# Patient Record
Sex: MALE | Race: WHITE | Employment: STUDENT | URBAN - METROPOLITAN AREA
[De-identification: names, ages, dates, MRNs, and addresses within clinical notes are randomized per-mention and may not be internally consistent; named-entity substitution may affect disease eponyms.]

---

## 2017-03-15 ENCOUNTER — OFFICE VISIT (OUTPATIENT)
Dept: FAMILY MEDICINE CLINIC | Facility: CLINIC | Age: 22
End: 2017-03-15

## 2017-03-15 VITALS
WEIGHT: 109.19 LBS | OXYGEN SATURATION: 98 % | HEIGHT: 71.5 IN | HEART RATE: 89 BPM | TEMPERATURE: 99 F | BODY MASS INDEX: 14.95 KG/M2 | SYSTOLIC BLOOD PRESSURE: 90 MMHG | DIASTOLIC BLOOD PRESSURE: 66 MMHG | RESPIRATION RATE: 14 BRPM

## 2017-03-15 DIAGNOSIS — K59.00 CONSTIPATION, UNSPECIFIED CONSTIPATION TYPE: Primary | ICD-10-CM

## 2017-03-15 PROCEDURE — 99213 OFFICE O/P EST LOW 20 MIN: CPT | Performed by: PHYSICIAN ASSISTANT

## 2017-03-15 NOTE — PROGRESS NOTES
CHIEF COMPLAINT:   Patient presents with:  Constipation        HPI:   Kosta Blue is a 24year old male who presents for complaints of possible constipation for 3 weeks.   Pt explains he does not if he truly has constipation, but he feels he does Meclizine HCl (ANTIVERT) 12.5 MG Oral Tab Take 1 tablet (12.5 mg total) by mouth 3 (three) times daily as needed for Dizziness. Disp: 45 tablet Rfl: 0   loratadine (CLARITIN) 10 MG Oral Tab Take 1 tablet by mouth daily.  Disp: 30 tablet Rfl: 11      Past Me Diagnoses and all orders for this visit:    Constipation, unspecified constipation type        PLAN:    -Discussed taking miralax more frequently.    -Pt advised [facility] cannot work up [employer] and he needs to see his PCP and go to ED immediately with any · Rectal bleeding from hemorrhoids or anal fissures (skin tears)  · Hernias  · Dependency on laxatives  · Chronic constipation  · Fecal impaction  · Bowel obstruction or perforation  Home care  All treatment should be done after talking with your healthcar When to seek medical advice  Call your healthcare provider right away if any of these occur:  · Fever over 100.4°F (38°C)  · Failure to resume normal bowel movements  · Pain in your abdomen or back gets worse  · Nausea or vomiting  · Swelling in your abdom Exercise helps improve the working of your colon which helps ease constipation. Try to get some physical activity every day. If you haven’t been active for a while, talk to your healthcare provider before starting again.   Laxatives  Your healthcare provide · Soluble fiber. This type of fiber is in oats, beans, and certain fruits and vegetables such as strawberries and peas. Soluble fiber can reduce cholesterol, which may help lower the risk for heart disease. It also helps control blood sugar levels.   Look f Date Last Reviewed: 5/11/2015  © 8401-6802 The [address] [place] [address], [address] [place], [place] [address]. All rights reserved. This information is not intended as a substitute for professional medical care.  Always follow your healthcare professional

## 2017-03-16 NOTE — PATIENT INSTRUCTIONS
-Start using miralax more frequently- try once a day or every other day for 1 week. -Push fruits and veggies.   -Drink plenty of water  -Follow up with PCP with \"brain fog\" and any worsening symptoms      Constipation (Adult)  Constipation means that yo All treatment should be done after talking with your healthcare provider. This is especially true if you have another medical problems, are taking prescription medicines, or are an older adult. Treatment most often involves lifestyle changes.  You may also · Pain in your abdomen or back gets worse  · Nausea or vomiting  · Swelling in your abdomen  · Blood in the stool  · Black, tarry stool  · Involuntary weight loss  · Weakness  Date Last Reviewed: 12/30/2015  © 4337-7069 The [employer] Your healthcare provider may suggest an over-the-counter product to help ease your constipation. He or she may suggest the use of bulk-forming agents or laxatives. The use of laxatives, if used as directed, is common and safe.  Follow directions carefully w · Whole-grain breads and cereals. Try to eat 6 to 8 ounces a day. Include wheat and oat bran cereals, whole-wheat muffins or toast, and corn tortillas in your meals. · Fruits. Try to eat 2 cups a day.  Apples, oranges, strawberries, pears, and bananas are

## 2017-03-23 RX ORDER — MONTELUKAST SODIUM 10 MG/1
TABLET ORAL
Qty: 90 TABLET | Refills: 1 | Status: SHIPPED | OUTPATIENT
Start: 2017-03-23 | End: 2017-09-28

## 2017-03-23 NOTE — TELEPHONE ENCOUNTER
Requesting Montelukast  LOV: 5/13/16  RTC: not noted   Last Labs: used for allergic rhinitis  Filled: 1/29/16 #90 with 4 refills    Future Appointments  Date Time Provider Aman Foote   4/8/2017 12:30 PM Julie Fothergill, MD [facility] [address] [facility] [address]       [place]

## 2017-04-08 ENCOUNTER — OFFICE VISIT (OUTPATIENT)
Dept: FAMILY MEDICINE CLINIC | Facility: CLINIC | Age: 22
End: 2017-04-08

## 2017-04-08 VITALS
TEMPERATURE: 98 F | SYSTOLIC BLOOD PRESSURE: 110 MMHG | BODY MASS INDEX: 14.79 KG/M2 | HEIGHT: 71.5 IN | HEART RATE: 80 BPM | DIASTOLIC BLOOD PRESSURE: 60 MMHG | WEIGHT: 108 LBS | RESPIRATION RATE: 16 BRPM

## 2017-04-08 DIAGNOSIS — K59.03 DRUG-INDUCED CONSTIPATION: Primary | ICD-10-CM

## 2017-04-08 DIAGNOSIS — R41.3 MEMORY DEFICIT: ICD-10-CM

## 2017-04-08 PROBLEM — K59.00 CONSTIPATION: Status: ACTIVE | Noted: 2017-04-08

## 2017-04-08 PROCEDURE — 99213 OFFICE O/P EST LOW 20 MIN: CPT | Performed by: INTERNAL MEDICINE

## 2017-04-08 RX ORDER — AMOXICILLIN 250 MG
1 CAPSULE ORAL 2 TIMES DAILY
Qty: 60 TABLET | Refills: 3 | Status: SHIPPED | OUTPATIENT
Start: 2017-04-08 | End: 2018-09-05 | Stop reason: ALTCHOICE

## 2017-04-08 RX ORDER — LACTULOSE 20 G/30ML
20 SOLUTION ORAL 3 TIMES DAILY PRN
Qty: 473 ML | Refills: 1 | Status: SHIPPED | OUTPATIENT
Start: 2017-04-08 | End: 2018-09-05 | Stop reason: ALTCHOICE

## 2017-04-08 NOTE — PROGRESS NOTES
[facility] Internal Medicine Office Note  Chief Complaint:   Patient presents with:  Constipation: went to [facility] on 3/15/16   Other: [facility]      HPI:   This is a 24year old male coming in for  HPI  Confused  +constipation  3 weeks  No f/c/s 71. 5\". Weight as of this encounter: 108 lb. Vital signs reviewed. Appears stated age, well groomed. With brown hair  Physical Exam    Constitutional: He appears well-developed. Cardiovascular: Normal rate. No murmur heard.   Pulmonary/Chest: Eff symptoms. Patient is to call with any side effects or complications from the treatments as a result of today.    Patient Active Problem List:     Allergic rhinitis     ADHD (attention deficit hyperactivity disorder)     Anxiety     Asperger syndrome     Ins

## 2017-04-08 NOTE — PATIENT INSTRUCTIONS
Thank you for choosing Juan Jose Gilmore MD at [place] [address]  To Do: Esthela Shepard  1. Stage 1- Continue miralax or metamucil every other day  2.  Stage 2- if constipation no bowel movement in a day- Start senokot pill twice a day if still constipated therapies have potential risk of harm or side effects or medication interactions.  It is your duty and for your safety to discuss with the pharmacist and our office with questions, and to notify us and stop treatment if problems arise, but know that our in

## 2017-07-25 ENCOUNTER — OFFICE VISIT (OUTPATIENT)
Dept: FAMILY MEDICINE CLINIC | Facility: CLINIC | Age: 22
End: 2017-07-25

## 2017-07-25 VITALS
HEART RATE: 82 BPM | HEIGHT: 71.5 IN | WEIGHT: 102 LBS | RESPIRATION RATE: 16 BRPM | DIASTOLIC BLOOD PRESSURE: 64 MMHG | SYSTOLIC BLOOD PRESSURE: 108 MMHG | TEMPERATURE: 100 F | BODY MASS INDEX: 13.97 KG/M2

## 2017-07-25 DIAGNOSIS — F90.2 ATTENTION DEFICIT HYPERACTIVITY DISORDER (ADHD), COMBINED TYPE: ICD-10-CM

## 2017-07-25 DIAGNOSIS — R41.0 CONFUSION: ICD-10-CM

## 2017-07-25 DIAGNOSIS — Z51.81 THERAPEUTIC DRUG MONITORING: Primary | ICD-10-CM

## 2017-07-25 DIAGNOSIS — F41.9 ANXIETY: ICD-10-CM

## 2017-07-25 DIAGNOSIS — H61.21 CERUMEN DEBRIS ON TYMPANIC MEMBRANE OF RIGHT EAR: ICD-10-CM

## 2017-07-25 PROCEDURE — 99215 OFFICE O/P EST HI 40 MIN: CPT | Performed by: INTERNAL MEDICINE

## 2017-07-25 RX ORDER — LORATADINE 10 MG/1
10 TABLET ORAL DAILY PRN
Qty: 30 TABLET | Refills: 11 | COMMUNITY
Start: 2017-07-25

## 2017-07-25 NOTE — PROGRESS NOTES
[facility] Internal Medicine Office Note  Chief Complaint:   Patient presents with:  Medication Follow-Up  med discussion  Anxiety  r ear pain    HPI:   This is a 24year old male coming in for  HPI  Pt with numerous complaints  Pt has asperger s Disp: 473 mL Rfl: 1   MONTELUKAST SODIUM 10 MG Oral Tab TAKE ONE TABLET BY MOUTH EVERY EVENING FOR ALLERGIES Disp: 90 tablet Rfl: 1   Methylphenidate HCl ER (CONCERTA) 54 MG Oral Tab CR Take 54 mg by mouth every morning.  Disp:  Rfl:    risperiDONE (RISPERD Weight as of this encounter: 102 lb. Vital signs reviewed. Appears stated age, well groomed. With brown hair  Physical Exam   Vitals reviewed. Constitutional: He is oriented to person, place, and time and thin. He appears well-developed.    HAYLEYT:   Omega Rosenthal proceeds to pull out his ipad  With a list of questions  Start Time: 4  End Time: 445  Therefore more than 40 minutes were spent face to face with the patient in which over half of that time was spent coordinating and counseling on  Numerous medical compla to call with any questions, complications, allergies, or worsening or changing symptoms. Patient is to call with any side effects or complications from the treatments as a result of today.    Patient Active Problem List:     Allergic rhinitis     ADHD (atte

## 2017-07-25 NOTE — PATIENT INSTRUCTIONS
Thank you for choosing Bryson Winters MD at [place] [address]  To Do: Merrick Lea  1. risperdal might cause brain fog and tiredness- an alternative could be abilify or trintellix  2. concerta is one of the least for constipation  3.  Trazodone helps w 2D Echocardiograms)  •[facility] call [phone] usually in [facility] in Clinic in [place] at [facility].  [address] Mon-Fri at 8am-7:30pm, and Sat/Sun 9am-430pm  Also at [address]  Call [phone] for info    • Please c twice a year.

## 2017-08-07 ENCOUNTER — TELEPHONE (OUTPATIENT)
Dept: FAMILY MEDICINE CLINIC | Facility: CLINIC | Age: 22
End: 2017-08-07

## 2017-08-07 NOTE — TELEPHONE ENCOUNTER
Requesting referral for ear pain  LOV: 7/25/17  Earwax and cerumen he declined ear cleaning but gladly accepted referral to ent. Henrietta Ochoa MD  RTC: none specified    Morris Troy M.D.   Otolaryngology (ENT)  [address] Consultants

## 2017-09-09 ENCOUNTER — LAB ENCOUNTER (OUTPATIENT)
Dept: LAB | Age: 22
End: 2017-09-09
Attending: INTERNAL MEDICINE
Payer: COMMERCIAL

## 2017-09-09 DIAGNOSIS — F41.9 ANXIETY: ICD-10-CM

## 2017-09-09 DIAGNOSIS — R41.0 CONFUSION: ICD-10-CM

## 2017-09-09 LAB
ALBUMIN SERPL-MCNC: 4.3 G/DL (ref 3.5–4.8)
ALP LIVER SERPL-CCNC: 47 U/L (ref 45–117)
ALT SERPL-CCNC: 32 U/L (ref 17–63)
AST SERPL-CCNC: 24 U/L (ref 15–41)
BASOPHILS # BLD AUTO: 0.03 X10(3) UL (ref 0–0.1)
BASOPHILS NFR BLD AUTO: 0.5 %
BILIRUB SERPL-MCNC: 0.6 MG/DL (ref 0.1–2)
BUN BLD-MCNC: 13 MG/DL (ref 8–20)
CALCIUM BLD-MCNC: 9.2 MG/DL (ref 8.3–10.3)
CHLORIDE: 103 MMOL/L (ref 101–111)
CO2: 30 MMOL/L (ref 22–32)
CREAT BLD-MCNC: 0.89 MG/DL (ref 0.7–1.3)
EOSINOPHIL # BLD AUTO: 0.06 X10(3) UL (ref 0–0.3)
EOSINOPHIL NFR BLD AUTO: 1.1 %
ERYTHROCYTE [DISTWIDTH] IN BLOOD BY AUTOMATED COUNT: 12.6 % (ref 11.5–16)
FOLATE (FOLIC ACID), SERUM: 14.7 NG/ML (ref 8.7–24)
FREE T4: 1 NG/DL (ref 0.9–1.8)
GLUCOSE BLD-MCNC: 79 MG/DL (ref 70–99)
HAV AB SERPL IA-ACNC: 683 PG/ML (ref 193–986)
HCT VFR BLD AUTO: 42.2 % (ref 37–53)
HGB BLD-MCNC: 14.2 G/DL (ref 13–17)
IMMATURE GRANULOCYTE COUNT: 0.01 X10(3) UL (ref 0–1)
IMMATURE GRANULOCYTE RATIO %: 0.2 %
IRON SATURATION: 12 % (ref 13–45)
IRON: 38 UG/DL (ref 45–182)
LYMPHOCYTES # BLD AUTO: 0.87 X10(3) UL (ref 0.9–4)
LYMPHOCYTES NFR BLD AUTO: 15.4 %
M PROTEIN MFR SERPL ELPH: 7.1 G/DL (ref 6.1–8.3)
MCH RBC QN AUTO: 33.2 PG (ref 27–33.2)
MCHC RBC AUTO-ENTMCNC: 33.6 G/DL (ref 31–37)
MCV RBC AUTO: 98.6 FL (ref 80–99)
MONOCYTES # BLD AUTO: 0.46 X10(3) UL (ref 0.1–0.6)
MONOCYTES NFR BLD AUTO: 8.1 %
NEUTROPHIL ABS PRELIM: 4.22 X10 (3) UL (ref 1.3–6.7)
NEUTROPHILS # BLD AUTO: 4.22 X10(3) UL (ref 1.3–6.7)
NEUTROPHILS NFR BLD AUTO: 74.7 %
PLATELET # BLD AUTO: 188 10(3)UL (ref 150–450)
POTASSIUM SERPL-SCNC: 4.3 MMOL/L (ref 3.6–5.1)
RBC # BLD AUTO: 4.28 X10(6)UL (ref 4.3–5.7)
RED CELL DISTRIBUTION WIDTH-SD: 45.5 FL (ref 35.1–46.3)
SODIUM SERPL-SCNC: 140 MMOL/L (ref 136–144)
TOTAL IRON BINDING CAPACITY: 328 UG/DL (ref 298–536)
TRANSFERRIN: 220 MG/DL (ref 200–360)
TSI SER-ACNC: 0.41 MIU/ML (ref 0.35–5.5)
WBC # BLD AUTO: 5.7 X10(3) UL (ref 4–13)

## 2017-09-09 PROCEDURE — 80050 GENERAL HEALTH PANEL: CPT | Performed by: INTERNAL MEDICINE

## 2017-09-09 PROCEDURE — 82607 VITAMIN B-12: CPT | Performed by: INTERNAL MEDICINE

## 2017-09-09 PROCEDURE — 84439 ASSAY OF FREE THYROXINE: CPT | Performed by: INTERNAL MEDICINE

## 2017-09-09 PROCEDURE — 82746 ASSAY OF FOLIC ACID SERUM: CPT | Performed by: INTERNAL MEDICINE

## 2017-09-09 PROCEDURE — 83540 ASSAY OF IRON: CPT | Performed by: INTERNAL MEDICINE

## 2017-09-09 PROCEDURE — 36415 COLL VENOUS BLD VENIPUNCTURE: CPT | Performed by: INTERNAL MEDICINE

## 2017-09-09 PROCEDURE — 83550 IRON BINDING TEST: CPT | Performed by: INTERNAL MEDICINE

## 2017-09-10 NOTE — PROGRESS NOTES
Labs here are normal,  all are fine. Would not change patient's current regimen. Please notify the patient.

## 2017-09-29 RX ORDER — MONTELUKAST SODIUM 10 MG/1
TABLET ORAL
Qty: 90 TABLET | Refills: 1 | Status: SHIPPED | OUTPATIENT
Start: 2017-09-29 | End: 2018-09-05

## 2017-09-29 NOTE — TELEPHONE ENCOUNTER
Requesting Montelukast  LOV: 7/25/17  RTC: not noted  Last Labs: used for allergic rhinitis  Filled: 3/23/17 #90 with 1 refills    Fails protocol - pended  Time started: 679    Time ended: 638    Total time spent on chart: 2 min      Future Appointments  D

## 2018-09-05 ENCOUNTER — OFFICE VISIT (OUTPATIENT)
Dept: FAMILY MEDICINE CLINIC | Facility: CLINIC | Age: 23
End: 2018-09-05
Payer: COMMERCIAL

## 2018-09-05 VITALS
DIASTOLIC BLOOD PRESSURE: 60 MMHG | HEART RATE: 80 BPM | SYSTOLIC BLOOD PRESSURE: 120 MMHG | RESPIRATION RATE: 16 BRPM | BODY MASS INDEX: 15.34 KG/M2 | HEIGHT: 71.5 IN | TEMPERATURE: 99 F | WEIGHT: 112 LBS

## 2018-09-05 DIAGNOSIS — J30.9 ALLERGIC RHINITIS, UNSPECIFIED SEASONALITY, UNSPECIFIED TRIGGER: Primary | ICD-10-CM

## 2018-09-05 PROCEDURE — 99203 OFFICE O/P NEW LOW 30 MIN: CPT | Performed by: FAMILY MEDICINE

## 2018-09-05 RX ORDER — MONTELUKAST SODIUM 10 MG/1
TABLET ORAL
Qty: 90 TABLET | Refills: 1 | Status: SHIPPED | OUTPATIENT
Start: 2018-09-05 | End: 2019-03-11

## 2018-09-05 RX ORDER — ARIPIPRAZOLE 5 MG/1
1 TABLET ORAL DAILY
COMMUNITY
Start: 2018-07-20 | End: 2018-12-12

## 2018-09-05 NOTE — PROGRESS NOTES
HPI:    Patient ID: Mary Renee is a 20year old male. HPI  Mr. Kimberley Schmitt is a pleasant 21 y/o M with history of autism spectrum, ADHD, depression, chronic allergies here today to establish care with me.   He goes and sees psychiatry for his psychiatric rhinorrhea. Right sinus exhibits no maxillary sinus tenderness and no frontal sinus tenderness. Left sinus exhibits no maxillary sinus tenderness and no frontal sinus tenderness.    Mouth/Throat: Uvula is midline and oropharynx is clear and moist. No oropha

## 2018-09-05 NOTE — PATIENT INSTRUCTIONS
Thank you for choosing Avery Lowe MD at [employer] [employer] [facility]  To Do: Sobia Akbar  1. Please take meds as directed. [facility] is located in [address]. Monday, Tuesday & Friday – 8 a.m. to 4 p.m. Wednesday, Thursday – 7 a.m. to 3 p. outweigh those potential risks and we strive to make you healthier and to improve your quality of life.     Referrals, and Radiology Information:    If your insurance requires a referral to a specialist, please allow 5 business days to process your referral

## 2018-11-10 ENCOUNTER — LAB ENCOUNTER (OUTPATIENT)
Dept: LAB | Age: 23
End: 2018-11-10
Attending: NURSE PRACTITIONER
Payer: COMMERCIAL

## 2018-11-10 ENCOUNTER — IMMUNIZATION (OUTPATIENT)
Dept: FAMILY MEDICINE CLINIC | Facility: CLINIC | Age: 23
End: 2018-11-10
Payer: COMMERCIAL

## 2018-11-10 DIAGNOSIS — R53.83 OTHER FATIGUE: ICD-10-CM

## 2018-11-10 DIAGNOSIS — Z23 NEED FOR VACCINATION: ICD-10-CM

## 2018-11-10 PROCEDURE — 90686 IIV4 VACC NO PRSV 0.5 ML IM: CPT | Performed by: NURSE PRACTITIONER

## 2018-11-10 PROCEDURE — 90471 IMMUNIZATION ADMIN: CPT | Performed by: NURSE PRACTITIONER

## 2018-11-10 PROCEDURE — 84443 ASSAY THYROID STIM HORMONE: CPT

## 2018-11-10 PROCEDURE — 82306 VITAMIN D 25 HYDROXY: CPT

## 2018-11-10 PROCEDURE — 80053 COMPREHEN METABOLIC PANEL: CPT

## 2018-11-10 PROCEDURE — 85025 COMPLETE CBC W/AUTO DIFF WBC: CPT

## 2018-11-10 PROCEDURE — 80307 DRUG TEST PRSMV CHEM ANLYZR: CPT

## 2018-11-10 PROCEDURE — 82746 ASSAY OF FOLIC ACID SERUM: CPT

## 2018-11-10 PROCEDURE — 36415 COLL VENOUS BLD VENIPUNCTURE: CPT

## 2018-11-10 PROCEDURE — 84439 ASSAY OF FREE THYROXINE: CPT

## 2018-11-10 PROCEDURE — 82607 VITAMIN B-12: CPT

## 2018-11-12 NOTE — PROGRESS NOTES
Largely within normal limits; vitamin d somewhat low, can take over the counter supplementation 1000 iu's. Globulin also very slightly low, but not likely meaningful.

## 2018-11-14 ENCOUNTER — MED REC SCAN ONLY (OUTPATIENT)
Dept: FAMILY MEDICINE CLINIC | Facility: CLINIC | Age: 23
End: 2018-11-14

## 2019-02-09 ENCOUNTER — APPOINTMENT (OUTPATIENT)
Dept: LAB | Age: 24
End: 2019-02-09
Attending: NURSE PRACTITIONER
Payer: COMMERCIAL

## 2019-02-09 DIAGNOSIS — Z79.899 MEDICATION MANAGEMENT: ICD-10-CM

## 2019-02-09 PROCEDURE — 93010 ELECTROCARDIOGRAM REPORT: CPT | Performed by: INTERNAL MEDICINE

## 2019-02-09 PROCEDURE — 93005 ELECTROCARDIOGRAM TRACING: CPT

## 2019-02-11 LAB
ATRIAL RATE: 85 BPM
P AXIS: 88 DEGREES
P-R INTERVAL: 118 MS
Q-T INTERVAL: 366 MS
QRS DURATION: 100 MS
QTC CALCULATION (BEZET): 435 MS
R AXIS: 81 DEGREES
T AXIS: 85 DEGREES
VENTRICULAR RATE: 85 BPM

## 2019-02-12 NOTE — PROGRESS NOTES
Reviewed with collaborating physician; ekg same as prior; ok to continue on concerta 54 mg every morning but dose should not be increased.

## 2019-03-11 RX ORDER — MONTELUKAST SODIUM 10 MG/1
TABLET ORAL
Qty: 90 TABLET | Refills: 1 | Status: SHIPPED | OUTPATIENT
Start: 2019-03-11 | End: 2019-08-29

## 2019-03-11 NOTE — TELEPHONE ENCOUNTER
Asthma & COPD Medication Protocol Failed3/11 9:19 AM   Asthma Action Score greater than or equal to 20    Appointment in past 6 or next 3 months     AAP/ACT given in last 12 months     Requesting montelukast 10mg  LOV: 9/5/18  RTC:  PRN  Last Relevant Labs

## 2019-08-29 RX ORDER — MONTELUKAST SODIUM 10 MG/1
TABLET ORAL
Qty: 90 TABLET | Refills: 0 | Status: SHIPPED | OUTPATIENT
Start: 2019-08-29 | End: 2019-11-23

## 2019-08-29 NOTE — TELEPHONE ENCOUNTER
Asthma & COPD Medication Protocol Failed8/29 9:18 AM   Asthma Action Score greater than or equal to 20    Appointment in past 6 or next 3 months     AAP/ACT given in last 12 months     Requesting MONTELUKAST SODIUM 10 MG   LOV: 9/5/18  RTC: PRN  Last Relev

## 2019-10-27 ENCOUNTER — OFFICE VISIT (OUTPATIENT)
Dept: FAMILY MEDICINE CLINIC | Facility: CLINIC | Age: 24
End: 2019-10-27
Payer: COMMERCIAL

## 2019-10-27 VITALS
WEIGHT: 116 LBS | DIASTOLIC BLOOD PRESSURE: 64 MMHG | HEART RATE: 107 BPM | TEMPERATURE: 99 F | BODY MASS INDEX: 16.61 KG/M2 | HEIGHT: 70 IN | OXYGEN SATURATION: 99 % | RESPIRATION RATE: 14 BRPM | SYSTOLIC BLOOD PRESSURE: 104 MMHG

## 2019-10-27 DIAGNOSIS — L21.9 SEBORRHEIC DERMATITIS: ICD-10-CM

## 2019-10-27 DIAGNOSIS — J06.9 VIRAL URI WITH COUGH: Primary | ICD-10-CM

## 2019-10-27 PROCEDURE — 99213 OFFICE O/P EST LOW 20 MIN: CPT | Performed by: NURSE PRACTITIONER

## 2019-10-27 NOTE — PROGRESS NOTES
CHIEF COMPLAINT:   Patient presents with:  Cough: Dry cough X 3 days, no wheezing. Nasal congestion, post nasal drip, runny nose, bilateral ear popping. No NVD. Fever: 101.8 on Friday nitght, resolved.        HPI:   Lucas Villa is a 21year old /64 (BP Location: Right arm, Patient Position: Sitting, Cuff Size: adult)   Pulse 107   Temp 99 °F (37.2 °C) (Oral)   Resp 14   Ht 70\"   Wt 116 lb (52.6 kg)   SpO2 99%   BMI 16.64 kg/m²   GENERAL: well developed, well nourished,in no apparent distre Seborrheic dermatitis is a common type of rash that causes red, scaly, greasy skin. It occurs on skin that has oil glands. These include the face, upper chest, and scalp, where it is often called dandruff.  It tends to last a long time, or go away and come In some cases one treatment will stop working after a while. Switching between treatments every few months may be helpful. Wash your skin gently. You can remove scales with oil and gentle rubbing or a brush.   Living with seborrheic dermatitis  Seborrheic · You may use acetaminophen or ibuprofen to control pain and fever, unless another medicine was prescribed. If you have chronic liver or kidney disease, have ever had a stomach ulcer or gastrointestinal bleeding, or are taking blood-thinning medicines, claudia © 8848-1367 The [address]. [address] [facility], [address] [place]. All rights reserved. This information is not intended as a substitute for professional medical care. Always follow your healthcare professional's instructions.             The

## 2019-10-27 NOTE — PATIENT INSTRUCTIONS
T gel shampoo 1-2 times a week or head and shoulders shampoo as directed. Dimetapp cough/cold/flu over the counter as directed. Increase fluid intake.  Rest!  Ibuprofen q 8 hours (8am, 4pm)  Tylenol every 6 hours (12pm, 8pm)    Understanding Seborrheic Cholo Thuy · Calcineurin inhibitor cream or ointment. These contain medicines such as pimecrolimus or tacrolimus. · Shampoo or cream with other medicines. These contain medicines such as coal tar, salicylic acid, or zinc pyrithione.   · Sodium sulfacetemide creams an · If symptoms are severe, rest at home for the first 2 to 3 days. When you resume activity, don't let yourself get too tired. · Don't smoke. If you need help stopping, talk with your healthcare provider.   · Avoid being exposed to cigarette smoke (yours or Date Last Reviewed: 6/1/2018  © 8180-8586 The [address]. [address] [facility], [address] [place]. All rights reserved. This information is not intended as a substitute for professional medical care.  Always follow your healthcare professional'

## 2019-11-25 RX ORDER — MONTELUKAST SODIUM 10 MG/1
TABLET ORAL
Qty: 30 TABLET | Refills: 0 | Status: SHIPPED | OUTPATIENT
Start: 2019-11-25 | End: 2019-12-17

## 2019-11-25 NOTE — TELEPHONE ENCOUNTER
Asthma & COPD Medication Protocol Zjesxl70/23 3:48 AM   Asthma Action Score greater than or equal to 20    Appointment in past 6 or next 3 months     AAP/ACT given in last 12 months     Requesting MONTELUKAST SODIUM 10 MG   LOV: 9/5/18  RTC:   Last Relevan

## 2019-12-17 RX ORDER — MONTELUKAST SODIUM 10 MG/1
TABLET ORAL
Qty: 30 TABLET | Refills: 0 | Status: SHIPPED | OUTPATIENT
Start: 2019-12-17 | End: 2020-01-08

## 2019-12-17 NOTE — TELEPHONE ENCOUNTER
Requested Prescriptions     Pending Prescriptions Disp Refills   • Montelukast Sodium 10 MG Oral Tab 30 tablet 0     Sig: TAKE ONE TABLET BY MOUTH IN THE EVENING FOR ALLERGIES       LOV: 9/5/2018  RTC: PRN or next wellness  Last Relevant ACT&AAP: none on f

## 2020-01-08 RX ORDER — MONTELUKAST SODIUM 10 MG/1
TABLET ORAL
Qty: 30 TABLET | Refills: 0 | Status: SHIPPED | OUTPATIENT
Start: 2020-01-08 | End: 2020-01-20

## 2020-01-08 NOTE — TELEPHONE ENCOUNTER
Asthma & COPD Medication Protocol Failed1/7 1:41 PM   Asthma Action Score greater than or equal to 20    AAP/ACT given in last 12 months    Appointment in past 6 or next 3 months      Requesting MONTELUKAST SODIUM 10 MG   LOV: 9/5/18  RTC:   Last Relevant

## 2020-01-20 ENCOUNTER — OFFICE VISIT (OUTPATIENT)
Dept: FAMILY MEDICINE CLINIC | Facility: CLINIC | Age: 25
End: 2020-01-20
Payer: COMMERCIAL

## 2020-01-20 VITALS
RESPIRATION RATE: 14 BRPM | HEART RATE: 92 BPM | WEIGHT: 114 LBS | HEIGHT: 70 IN | TEMPERATURE: 98 F | SYSTOLIC BLOOD PRESSURE: 100 MMHG | DIASTOLIC BLOOD PRESSURE: 60 MMHG | BODY MASS INDEX: 16.32 KG/M2

## 2020-01-20 DIAGNOSIS — Z00.00 WELLNESS EXAMINATION: Primary | ICD-10-CM

## 2020-01-20 PROCEDURE — 99395 PREV VISIT EST AGE 18-39: CPT | Performed by: FAMILY MEDICINE

## 2020-01-20 RX ORDER — MONTELUKAST SODIUM 10 MG/1
10 TABLET ORAL NIGHTLY
Qty: 90 TABLET | Refills: 1 | Status: SHIPPED | OUTPATIENT
Start: 2020-01-20 | End: 2020-07-23

## 2020-01-20 NOTE — PATIENT INSTRUCTIONS
Thank you for choosing Gokul Tellez MD at [place] [address]  To Do: [facility]  1. Please see age appropriate health prevention below    [facility] is located in [address]. Monday, Tuesday & Friday – 8 a.m. to 4 p.m.   Wednesday, that the benefits outweigh those potential risks and we strive to make you healthier and to improve your quality of life.     Referrals, and Radiology Information:    If your insurance requires a referral to a specialist, please allow 5 business days to pro exams   Blood pressure All men in this age group Yearly checkup if your blood pressure is normal  Normal blood pressure is less than 120/80  If your blood pressure is higher than normal, follow the advice of your healthcare provider.     All men in this ag up to age 32 3 doses; the second dose should be given 1 to 2 months after the first dose and the third dose given 6 months after the first dose   Influenza (flu) All men in this age group Once a year   Measles, mumps, rubella (MMR) All men in this age [de-identified]

## 2020-01-20 NOTE — PROGRESS NOTES
Wellness Exam    REASON FOR VISIT:    Peter Castillo is a 21year old male who presents for an [address]     Current Complaints: Mr. Nancy Puckett is a pleasant 20 y/o M here for his wellness exam  Flu Shot: see immunization record  [address] years There are no preventive care reminders to display for this patient. Flex Sigmoidoscopy Screen  Every 5 years No results found for this or any previous visit.     Fecal Occult Blood  Annually No results found for: FOB, OCCULTSTOOL    Obesity Screeni needed.    30 tablet 11      MEDICAL INFORMATION:   Past Medical History:   Diagnosis Date   • ADHD (attention deficit hyperactivity disorder) 6/9/2014   • Anxiety 6/9/2014   • Asperger syndrome 6/9/2014   • Constipation 4/8/2017   • Eustachian tube dysfunc bilaterally  Nose: Nose normal.   Eyes: EOM are normal. Pupils are equal, round, and reactive to light. No scleral icterus. Neck: Normal range of motion. No thyromegaly present. Cardiovascular: Normal rate, regular rhythm and normal heart sounds.   Exam are no barriers to learning. Medical education done. Outcome: Patient verbalizes understanding. Educated by: MD   The patient indicates understanding of these issues and agrees to the plan.     SUGGESTED VACCINATIONS - Influenza, Pneumococcal, Zoster, T Allergies:  Augmentin, [Amoxici*      Sulfa Antibiotics          PHYSICAL EXAM:   Physical Exam           ASSESSMENT/PLAN:   Wellness examination  (primary encounter diagnosis)    No orders of the defined types were placed in this encounter.       Meds

## 2020-07-15 ENCOUNTER — TELEPHONE (OUTPATIENT)
Dept: FAMILY MEDICINE CLINIC | Facility: CLINIC | Age: 25
End: 2020-07-15

## 2020-07-15 NOTE — TELEPHONE ENCOUNTER
Pt returned call, this writer asked pt about the nature of his chest pain. Pt stated it is in the center of his chest and has been intermittent over the last week, pt denies any fever, cough, chills, loss of taste or smell.   Pt states the pain does not ca

## 2020-07-15 NOTE — TELEPHONE ENCOUNTER
Patient scheduled OV online, appointment notes stated has occasional chest pain. Please advise if appointment should be kept for tomorrow.

## 2020-07-16 ENCOUNTER — OFFICE VISIT (OUTPATIENT)
Dept: FAMILY MEDICINE CLINIC | Facility: CLINIC | Age: 25
End: 2020-07-16
Payer: COMMERCIAL

## 2020-07-16 VITALS
RESPIRATION RATE: 16 BRPM | WEIGHT: 115 LBS | HEIGHT: 70 IN | DIASTOLIC BLOOD PRESSURE: 68 MMHG | BODY MASS INDEX: 16.46 KG/M2 | TEMPERATURE: 97 F | HEART RATE: 82 BPM | SYSTOLIC BLOOD PRESSURE: 112 MMHG

## 2020-07-16 DIAGNOSIS — Z20.822 ENCOUNTER FOR PREOPERATIVE SCREENING LABORATORY TESTING FOR COVID-19 VIRUS: ICD-10-CM

## 2020-07-16 DIAGNOSIS — R07.9 CHEST PAIN, UNSPECIFIED TYPE: Primary | ICD-10-CM

## 2020-07-16 DIAGNOSIS — Z01.812 ENCOUNTER FOR PREOPERATIVE SCREENING LABORATORY TESTING FOR COVID-19 VIRUS: ICD-10-CM

## 2020-07-16 DIAGNOSIS — F41.9 ANXIETY: ICD-10-CM

## 2020-07-16 PROCEDURE — 3074F SYST BP LT 130 MM HG: CPT | Performed by: FAMILY MEDICINE

## 2020-07-16 PROCEDURE — 3008F BODY MASS INDEX DOCD: CPT | Performed by: FAMILY MEDICINE

## 2020-07-16 PROCEDURE — 99214 OFFICE O/P EST MOD 30 MIN: CPT | Performed by: FAMILY MEDICINE

## 2020-07-16 PROCEDURE — 93000 ELECTROCARDIOGRAM COMPLETE: CPT | Performed by: FAMILY MEDICINE

## 2020-07-16 PROCEDURE — 3078F DIAST BP <80 MM HG: CPT | Performed by: FAMILY MEDICINE

## 2020-07-16 RX ORDER — ARIPIPRAZOLE 2 MG/1
1 TABLET ORAL DAILY
COMMUNITY
End: 2020-08-10 | Stop reason: ALTCHOICE

## 2020-07-16 NOTE — PROGRESS NOTES
HPI:    Patient ID: Zahraa Omer is a 21year old male.     HPI  Mr. Mak Mayo is a pleasant 50-year-old gentleman with known history of depression anxiety for which she goes to psychiatry for management here today because of chest pain for 1 week desc exudate. Eyes: Conjunctivae are normal. No scleral icterus. Neck: Neck supple. No thyromegaly present. Cardiovascular: Normal rate, regular rhythm and normal heart sounds. No murmur heard.   Pulmonary/Chest: Effort normal and breath sounds normal. N

## 2020-07-23 NOTE — TELEPHONE ENCOUNTER
Asthma & COPD Medication Protocol Failed7/23 2:41 PM   Asthma Action Score greater than or equal to 20    AAP/ACT given in last 12 months    Appointment in past 6 or next 3 months      Refill protocol failed because the patient did not meet the protocol cr

## 2020-07-25 RX ORDER — MONTELUKAST SODIUM 10 MG/1
TABLET ORAL
Qty: 90 TABLET | Refills: 1 | Status: SHIPPED | OUTPATIENT
Start: 2020-07-25 | End: 2021-01-13

## 2021-01-13 RX ORDER — MONTELUKAST SODIUM 10 MG/1
TABLET ORAL
Qty: 90 TABLET | Refills: 1 | Status: SHIPPED | OUTPATIENT
Start: 2021-01-13 | End: 2021-07-02

## 2021-01-13 NOTE — TELEPHONE ENCOUNTER
Asthma & COPD Medication Protocol [id number]/13/2021 12:34 PM   Asthma Action Score greater than or equal to 20    AAP/ACT given in last 12 months    Appointment in past 6 or next 3 months      Refill protocol failed because the patient did not meet the prot

## 2021-01-29 ENCOUNTER — OFFICE VISIT (OUTPATIENT)
Dept: FAMILY MEDICINE CLINIC | Facility: CLINIC | Age: 26
End: 2021-01-29
Payer: COMMERCIAL

## 2021-01-29 VITALS
HEART RATE: 93 BPM | TEMPERATURE: 97 F | BODY MASS INDEX: 15.66 KG/M2 | HEIGHT: 70 IN | SYSTOLIC BLOOD PRESSURE: 102 MMHG | WEIGHT: 109.38 LBS | DIASTOLIC BLOOD PRESSURE: 70 MMHG | RESPIRATION RATE: 16 BRPM

## 2021-01-29 DIAGNOSIS — Z00.00 WELLNESS EXAMINATION: Primary | ICD-10-CM

## 2021-01-29 PROCEDURE — 99395 PREV VISIT EST AGE 18-39: CPT | Performed by: FAMILY MEDICINE

## 2021-01-29 PROCEDURE — 3008F BODY MASS INDEX DOCD: CPT | Performed by: FAMILY MEDICINE

## 2021-01-29 PROCEDURE — 3074F SYST BP LT 130 MM HG: CPT | Performed by: FAMILY MEDICINE

## 2021-01-29 PROCEDURE — 3078F DIAST BP <80 MM HG: CPT | Performed by: FAMILY MEDICINE

## 2021-01-29 NOTE — PROGRESS NOTES
Wellness Exam    REASON FOR VISIT:    Jaylyn Aguilar is a 31year old male who presents for an [address].     Current Complaints: Mr. Madrid Caller is here for his wellness exam.  Flu Shot: see immunization record  Health Maintenance Topics wi blood pressure or other  risk factors No results found for: A1C  Glucose (mg/dL)   Date Value   11/10/2018 80     GLUCOSE (mg/dL)   Date Value   07/13/2013 90         Gonorrhea Screening If high risk No results found for: GONOCOCCUS    HIV Screening For al Smokeless tobacco: Never Used    Alcohol use: No    Drug use: No         REVIEW OF SYSTEMS:   Constitutional: Negative for fever, chills and fatigue. HENT: Negative for hearing loss, congestion, sore throat and neck pain.     Eyes: Negative for pain and v behavior is normal.     ASSESSMENT AND OTHER RELEVANT CHRONIC CONDITIONS:   Myranda Ybarra is a 31year old male who presents for an [address]    Wellness examination  (primary encounter diagnosis)    PLAN SUMMARY:   Syl Coburn after 1957 and not immune     Patient Active Problem List:     Allergic rhinitis     Attention deficit hyperactivity disorder (ADHD)     Anxiety disorder     Asperger syndrome     Insomnia     BPPV (benign paroxysmal positional vertigo)     Eustachian tube

## 2021-01-29 NOTE — PATIENT INSTRUCTIONS
Thank you for choosing Sharmaine Taylor MD at [place] [address]  To Do: [facility]  1. Please see age appropriate health prevention below    [employer] is located in [address]. Monday, Tuesday & Friday – 8 a.m. to 4 p.m.   Wednesday, that the benefits outweigh those potential risks and we strive to make you healthier and to improve your quality of life.     Referrals, and Radiology Information:    If your insurance requires a referral to a specialist, please allow 5 business days to pro routine exams   Blood pressure All men in this age group Yearly checkup if your blood pressure is normal  Normal blood pressure is less than 120/80  If your blood pressure is higher than normal, follow the advice of your healthcare provider.     Diabetes me dose given 6 months after the first dose    Influenza (flu) All men in this age group Once a year   Measles, mumps, rubella (MMR) All men in this age group who have no record of these infections or vaccines 1 or 2 doses through age 54   Meningococcal Men a

## 2021-02-02 ENCOUNTER — TELEPHONE (OUTPATIENT)
Dept: FAMILY MEDICINE CLINIC | Facility: CLINIC | Age: 26
End: 2021-02-02

## 2021-02-02 NOTE — TELEPHONE ENCOUNTER
Received request and authorization from [place], Disability Determination for medical recordsbilling  from 1/1/2019 to present . Fax to [phone] when complete . Faxed to [employer] for processing.

## 2021-03-25 NOTE — TELEPHONE ENCOUNTER
Pt wants to know who Dr Eloy Tovar wants him to see for ear pain Terbinafine Counseling: Patient counseling regarding adverse effects of terbinafine including but not limited to headache, diarrhea, rash, upset stomach, liver function test abnormalities, itching, taste/smell disturbance, nausea, abdominal pain, and flatulence.  There is a rare possibility of liver failure that can occur when taking terbinafine.  The patient understands that a baseline LFT and kidney function test may be required. The patient verbalized understanding of the proper use and possible adverse effects of terbinafine.  All of the patient's questions and concerns were addressed.

## 2021-03-29 ENCOUNTER — OFFICE VISIT (OUTPATIENT)
Dept: FAMILY MEDICINE CLINIC | Facility: CLINIC | Age: 26
End: 2021-03-29
Payer: COMMERCIAL

## 2021-03-29 VITALS
RESPIRATION RATE: 16 BRPM | HEART RATE: 96 BPM | HEIGHT: 70 IN | SYSTOLIC BLOOD PRESSURE: 110 MMHG | OXYGEN SATURATION: 99 % | DIASTOLIC BLOOD PRESSURE: 70 MMHG | BODY MASS INDEX: 15.75 KG/M2 | TEMPERATURE: 97 F | WEIGHT: 110 LBS

## 2021-03-29 DIAGNOSIS — R00.2 PALPITATIONS: Primary | ICD-10-CM

## 2021-03-29 PROCEDURE — 93000 ELECTROCARDIOGRAM COMPLETE: CPT | Performed by: FAMILY MEDICINE

## 2021-03-29 PROCEDURE — 3008F BODY MASS INDEX DOCD: CPT | Performed by: FAMILY MEDICINE

## 2021-03-29 PROCEDURE — 3078F DIAST BP <80 MM HG: CPT | Performed by: FAMILY MEDICINE

## 2021-03-29 PROCEDURE — 3074F SYST BP LT 130 MM HG: CPT | Performed by: FAMILY MEDICINE

## 2021-03-29 PROCEDURE — 99214 OFFICE O/P EST MOD 30 MIN: CPT | Performed by: FAMILY MEDICINE

## 2021-03-29 RX ORDER — MIRTAZAPINE 30 MG/1
1 TABLET, FILM COATED ORAL NIGHTLY
COMMUNITY
End: 2021-04-06

## 2021-03-29 NOTE — PROGRESS NOTES
HPI/Subjective:   Patient ID: Mark Hawk is a 31year old male. HPI  Mr. Kimberley Schmitt is a pleasant 50-year-old gentleman with history of allergies, ADD, anxiety, autism here today for palpitations. This has been ongoing over the past few days.   He acute distress. HENT:      Mouth/Throat:      Mouth: Mucous membranes are moist.      Pharynx: Oropharynx is clear. Eyes:      General: No scleral icterus.      Conjunctiva/sclera: Conjunctivae normal.      Pupils: Pupils are equal, round, and reactive t

## 2021-03-29 NOTE — PATIENT INSTRUCTIONS
Thank you for choosing Brittany Chicas MD at [place] [address]  To Do: [facility]  1. Please see info below  [facility] is located in [address]. Monday, Tuesday & Friday – 8 a.m. to 4 p.m. Wednesday, Thursday – 7 a.m. to 3 p.m.   Dale Tate those potential risks and we strive to make you healthier and to improve your quality of life.     Referrals, and Radiology Information:    If your insurance requires a referral to a specialist, please allow 5 business days to process your referral request. travel along pathways. In the ventricles, these pathways are called bundle branches. The SA (sinoatrial) node  The SA usually sets the pace of the heartbeat. Each heartbeat is normally evenly spaced from beat to beat.  It starts each beat by releasing an

## 2021-04-09 ENCOUNTER — HOSPITAL ENCOUNTER (OUTPATIENT)
Dept: CV DIAGNOSTICS | Age: 26
Discharge: HOME | End: 2021-04-09
Attending: FAMILY MEDICINE
Payer: COMMERCIAL

## 2021-04-09 DIAGNOSIS — R00.2 PALPITATIONS: ICD-10-CM

## 2021-04-09 PROCEDURE — 93270 REMOTE 30 DAY ECG REV/REPORT: CPT | Performed by: FAMILY MEDICINE

## 2021-04-09 PROCEDURE — 93272 ECG/REVIEW INTERPRET ONLY: CPT | Performed by: FAMILY MEDICINE

## 2021-04-14 ENCOUNTER — IMMUNIZATION (OUTPATIENT)
Dept: LAB | Facility: HOSPITAL | Age: 26
End: 2021-04-14
Attending: EMERGENCY MEDICINE
Payer: COMMERCIAL

## 2021-04-14 DIAGNOSIS — Z23 NEED FOR VACCINATION: Primary | ICD-10-CM

## 2021-04-14 PROCEDURE — 0011A SARSCOV2 VAC 100MCG/0.5ML IM: CPT

## 2021-05-12 ENCOUNTER — IMMUNIZATION (OUTPATIENT)
Dept: LAB | Facility: HOSPITAL | Age: 26
End: 2021-05-12
Attending: EMERGENCY MEDICINE
Payer: COMMERCIAL

## 2021-05-12 DIAGNOSIS — Z23 NEED FOR VACCINATION: Primary | ICD-10-CM

## 2021-05-12 PROCEDURE — 0012A SARSCOV2 VAC 100MCG/0.5ML IM: CPT

## 2021-07-02 RX ORDER — MONTELUKAST SODIUM 10 MG/1
TABLET ORAL
Qty: 90 TABLET | Refills: 0 | Status: SHIPPED | OUTPATIENT
Start: 2021-07-02 | End: 2021-09-27

## 2021-07-02 NOTE — TELEPHONE ENCOUNTER
Requested Prescriptions     Name from pharmacy: Montelukast Sodium 10 Mg Tab Auro         Will file in chart as: MONTELUKAST SODIUM 10 MG Oral Tab    Sig: Take 1 tablet by mouth once nightly    Disp:  90 tablet    Refills:  0    Start: 7/1/2021    Class: N

## 2021-09-27 RX ORDER — MONTELUKAST SODIUM 10 MG/1
TABLET ORAL
Qty: 90 TABLET | Refills: 0 | Status: SHIPPED | OUTPATIENT
Start: 2021-09-27 | End: 2021-12-20

## 2021-09-27 NOTE — TELEPHONE ENCOUNTER
Montelukast Sodium 10 Mg Tab Joeo          Will file in chart as: MONTELUKAST 10 MG Oral Tab    Sig: Take 1 tablet by mouth once nightly    Disp:  90 tablet    Refills:  0    Start: 9/27/2021    Class: Normal    Non-formulary    Last ordered: 2 months ago

## 2021-12-20 RX ORDER — MONTELUKAST SODIUM 10 MG/1
TABLET ORAL
Qty: 90 TABLET | Refills: 0 | Status: SHIPPED | OUTPATIENT
Start: 2021-12-20

## 2021-12-20 NOTE — TELEPHONE ENCOUNTER
: Montelukast Sodium 10 Mg Tab Auro          Will file in chart as: MONTELUKAST 10 MG Oral Tab    Sig: Take 1 tablet by mouth once nightly    Disp:  90 tablet    Refills:  0    Start: 12/20/2021    Class: Normal    Non-formulary    Last ordered: 2 months a

## 2022-03-20 RX ORDER — MONTELUKAST SODIUM 10 MG/1
TABLET ORAL
Qty: 90 TABLET | Refills: 0 | Status: SHIPPED | OUTPATIENT
Start: 2022-03-20

## 2022-06-16 RX ORDER — MONTELUKAST SODIUM 10 MG/1
TABLET ORAL
Qty: 90 TABLET | Refills: 0 | Status: SHIPPED | OUTPATIENT
Start: 2022-06-16

## 2022-09-13 RX ORDER — MONTELUKAST SODIUM 10 MG/1
TABLET ORAL
Qty: 90 TABLET | Refills: 0 | Status: SHIPPED | OUTPATIENT
Start: 2022-09-13

## 2022-12-27 RX ORDER — MONTELUKAST SODIUM 10 MG/1
TABLET ORAL
Qty: 90 TABLET | Refills: 0 | Status: SHIPPED | OUTPATIENT
Start: 2022-12-27

## 2023-03-22 RX ORDER — MONTELUKAST SODIUM 10 MG/1
TABLET ORAL
Qty: 90 TABLET | Refills: 0 | OUTPATIENT
Start: 2023-03-22

## 2023-06-22 ENCOUNTER — OFFICE VISIT (OUTPATIENT)
Dept: FAMILY MEDICINE CLINIC | Facility: CLINIC | Age: 28
End: 2023-06-22
Payer: COMMERCIAL

## 2023-06-22 VITALS
DIASTOLIC BLOOD PRESSURE: 66 MMHG | RESPIRATION RATE: 16 BRPM | SYSTOLIC BLOOD PRESSURE: 100 MMHG | BODY MASS INDEX: 15.17 KG/M2 | TEMPERATURE: 98 F | HEART RATE: 88 BPM | HEIGHT: 70 IN | WEIGHT: 106 LBS

## 2023-06-22 DIAGNOSIS — E55.9 VITAMIN D DEFICIENCY: ICD-10-CM

## 2023-06-22 DIAGNOSIS — Z00.00 WELLNESS EXAMINATION: Primary | ICD-10-CM

## 2023-06-22 PROCEDURE — 90715 TDAP VACCINE 7 YRS/> IM: CPT | Performed by: FAMILY MEDICINE

## 2023-06-22 PROCEDURE — 90471 IMMUNIZATION ADMIN: CPT | Performed by: FAMILY MEDICINE

## 2023-06-22 PROCEDURE — 99395 PREV VISIT EST AGE 18-39: CPT | Performed by: FAMILY MEDICINE

## 2023-06-22 RX ORDER — MONTELUKAST SODIUM 10 MG/1
10 TABLET ORAL NIGHTLY
Qty: 90 TABLET | Refills: 3 | Status: SHIPPED | OUTPATIENT
Start: 2023-06-22

## 2023-06-22 NOTE — PATIENT INSTRUCTIONS
Thank you for choosing Kristie Chen MD at [place] [address]  To Do: [facility]  1. Please see age appropriate health prevention below    [facility] is located in [address]. Monday, Tuesday & Friday - 8 a.m. to 4 p.m. Wednesday, Thursday - 7 a.m. to 3 p.m. The lab is closed daily from 12 p.m.-12:30 p.m. Saturday lab hours by appointment. Call [phone] to schedule the appointment. Please signup for [employer], which is electronic access to your record if you have not done so. All your results will post on there. [email or web address]   You can NOW use [employer] to book your appointments with us, or consider using open access scheduling which is through the edward website [email or web address] and type in Kristie Chen MD and follow the links for \"Schedule Online Now\"    To schedule Imaging or tests at [facility] Scheduling [phone], Go to [facility] A ER Building (For example: CT scans, X rays, Ultrasound, MRI)  Cardiac Testing in ER building Building A second floor Cardiac Testing [phone] (For example: Holter Monitor, Cardiac Stress tests,Event Monitor, or 2D Echocardiograms)  [facility] call [phone] usually in Bldg A  Walk in Clinic in [place] at [facility]. [address] Mon-Fri at 8am-7:30 p.m., and Sat/Sun 9:00a. m.-4:30 p.m. Also at [address] [facility]  Call [phone] for info     Please call our office about any questions regarding your treatment/medicines/tests as a result of today's visit. For your safety, read the entire package insert of all medicines prescribed to you and be aware of all of the risks of treatment even beyond those discussed today. All therapies have potential risk of harm or side effects or medication interactions.   It is your duty and for your safety to discuss with the pharmacist and our office with questions, and to notify us and stop treatment if problems arise, but know that our intention is that the benefits outweigh those potential risks and we strive to make you healthier and to improve your quality of life. Referrals, and Radiology Information:    If your insurance requires a referral to a specialist, please allow 5 business days to process your referral request.    If Sal Sales MD orders a CT or MRI, it may take up to 10 business days to receive approval from your insurance company. Once our office has called informing you that the insurance company approved your testing, please call Central Scheduling at [phone]  Please allow our office 5 business days to contact you regarding any testing results. Refill policies:   Allow 3 business days for refills; controlled substances may take longer and must be picked up from the office in person. Narcotic medications can only be filled in 30 day increments and must be refilled at an office visit only. If your prescription is due for a refill, you may be due for a follow-up appointment. We cannot refill your maintenance medications at a preventative wellness visit. To best provide you care, patients receiving maintenance medications need to be seen at least twice a year.

## 2023-07-01 ENCOUNTER — LAB ENCOUNTER (OUTPATIENT)
Dept: LAB | Age: 28
End: 2023-07-01
Attending: FAMILY MEDICINE
Payer: COMMERCIAL

## 2023-07-01 DIAGNOSIS — Z00.00 WELLNESS EXAMINATION: ICD-10-CM

## 2023-07-01 DIAGNOSIS — E55.9 VITAMIN D DEFICIENCY: ICD-10-CM

## 2023-07-01 LAB
ALBUMIN SERPL-MCNC: 4.2 G/DL (ref 3.4–5)
ALBUMIN/GLOB SERPL: 1.4 {RATIO} (ref 1–2)
ALP LIVER SERPL-CCNC: 57 U/L
ALT SERPL-CCNC: 26 U/L
ANION GAP SERPL CALC-SCNC: 2 MMOL/L (ref 0–18)
AST SERPL-CCNC: 26 U/L (ref 15–37)
BASOPHILS # BLD AUTO: 0.04 X10(3) UL (ref 0–0.2)
BASOPHILS NFR BLD AUTO: 0.8 %
BILIRUB SERPL-MCNC: 0.4 MG/DL (ref 0.1–2)
BUN BLD-MCNC: 10 MG/DL (ref 7–18)
CALCIUM BLD-MCNC: 9.2 MG/DL (ref 8.5–10.1)
CHLORIDE SERPL-SCNC: 107 MMOL/L (ref 98–112)
CHOLEST SERPL-MCNC: 166 MG/DL (ref ?–200)
CO2 SERPL-SCNC: 31 MMOL/L (ref 21–32)
CREAT BLD-MCNC: 0.97 MG/DL
EOSINOPHIL # BLD AUTO: 0.19 X10(3) UL (ref 0–0.7)
EOSINOPHIL NFR BLD AUTO: 3.6 %
ERYTHROCYTE [DISTWIDTH] IN BLOOD BY AUTOMATED COUNT: 12.2 %
FASTING PATIENT LIPID ANSWER: YES
FASTING STATUS PATIENT QL REPORTED: YES
GFR SERPLBLD BASED ON 1.73 SQ M-ARVRAT: 109 ML/MIN/1.73M2 (ref 60–?)
GLOBULIN PLAS-MCNC: 2.9 G/DL (ref 2.8–4.4)
GLUCOSE BLD-MCNC: 87 MG/DL (ref 70–99)
HCT VFR BLD AUTO: 42.9 %
HDLC SERPL-MCNC: 60 MG/DL (ref 40–59)
HGB BLD-MCNC: 13.9 G/DL
IMM GRANULOCYTES # BLD AUTO: 0.01 X10(3) UL (ref 0–1)
IMM GRANULOCYTES NFR BLD: 0.2 %
LDLC SERPL CALC-MCNC: 96 MG/DL (ref ?–100)
LYMPHOCYTES # BLD AUTO: 2.82 X10(3) UL (ref 1–4)
LYMPHOCYTES NFR BLD AUTO: 53.6 %
MCH RBC QN AUTO: 31.7 PG (ref 26–34)
MCHC RBC AUTO-ENTMCNC: 32.4 G/DL (ref 31–37)
MCV RBC AUTO: 97.7 FL
MONOCYTES # BLD AUTO: 0.39 X10(3) UL (ref 0.1–1)
MONOCYTES NFR BLD AUTO: 7.4 %
NEUTROPHILS # BLD AUTO: 1.81 X10 (3) UL (ref 1.5–7.7)
NEUTROPHILS # BLD AUTO: 1.81 X10(3) UL (ref 1.5–7.7)
NEUTROPHILS NFR BLD AUTO: 34.4 %
NONHDLC SERPL-MCNC: 106 MG/DL (ref ?–130)
OSMOLALITY SERPL CALC.SUM OF ELEC: 288 MOSM/KG (ref 275–295)
PLATELET # BLD AUTO: 224 10(3)UL (ref 150–450)
POTASSIUM SERPL-SCNC: 4.7 MMOL/L (ref 3.5–5.1)
PROT SERPL-MCNC: 7.1 G/DL (ref 6.4–8.2)
RBC # BLD AUTO: 4.39 X10(6)UL
SODIUM SERPL-SCNC: 140 MMOL/L (ref 136–145)
T4 FREE SERPL-MCNC: 0.8 NG/DL (ref 0.8–1.7)
TRIGL SERPL-MCNC: 46 MG/DL (ref 30–149)
TSI SER-ACNC: 0.92 MIU/ML (ref 0.36–3.74)
VIT D+METAB SERPL-MCNC: 39.5 NG/ML (ref 30–100)
VLDLC SERPL CALC-MCNC: 8 MG/DL (ref 0–30)
WBC # BLD AUTO: 5.3 X10(3) UL (ref 4–11)

## 2023-07-01 PROCEDURE — 80053 COMPREHEN METABOLIC PANEL: CPT

## 2023-07-01 PROCEDURE — 84439 ASSAY OF FREE THYROXINE: CPT

## 2023-07-01 PROCEDURE — 84443 ASSAY THYROID STIM HORMONE: CPT

## 2023-07-01 PROCEDURE — 36415 COLL VENOUS BLD VENIPUNCTURE: CPT

## 2023-07-01 PROCEDURE — 82306 VITAMIN D 25 HYDROXY: CPT

## 2023-07-01 PROCEDURE — 85025 COMPLETE CBC W/AUTO DIFF WBC: CPT

## 2023-07-01 PROCEDURE — 80061 LIPID PANEL: CPT

## 2023-07-19 ENCOUNTER — HOSPITAL ENCOUNTER (EMERGENCY)
Age: 28
Discharge: HOME | End: 2023-07-19
Attending: EMERGENCY MEDICINE
Payer: COMMERCIAL

## 2023-07-19 ENCOUNTER — APPOINTMENT (OUTPATIENT)
Dept: CT IMAGING | Age: 28
End: 2023-07-19
Attending: EMERGENCY MEDICINE
Payer: COMMERCIAL

## 2023-07-19 VITALS
HEART RATE: 98 BPM | WEIGHT: 106 LBS | DIASTOLIC BLOOD PRESSURE: 72 MMHG | BODY MASS INDEX: 14.36 KG/M2 | SYSTOLIC BLOOD PRESSURE: 119 MMHG | RESPIRATION RATE: 16 BRPM | OXYGEN SATURATION: 96 % | HEIGHT: 72 IN | TEMPERATURE: 98 F

## 2023-07-19 DIAGNOSIS — R20.2 PARESTHESIAS: Primary | ICD-10-CM

## 2023-07-19 PROCEDURE — 99284 EMERGENCY DEPT VISIT MOD MDM: CPT

## 2023-07-19 PROCEDURE — 70450 CT HEAD/BRAIN W/O DYE: CPT | Performed by: EMERGENCY MEDICINE

## 2023-07-27 ENCOUNTER — OFFICE VISIT (OUTPATIENT)
Dept: FAMILY MEDICINE CLINIC | Facility: CLINIC | Age: 28
End: 2023-07-27
Payer: COMMERCIAL

## 2023-07-27 VITALS
DIASTOLIC BLOOD PRESSURE: 80 MMHG | HEIGHT: 72 IN | SYSTOLIC BLOOD PRESSURE: 106 MMHG | WEIGHT: 104 LBS | TEMPERATURE: 98 F | OXYGEN SATURATION: 98 % | HEART RATE: 74 BPM | RESPIRATION RATE: 16 BRPM | BODY MASS INDEX: 14.09 KG/M2

## 2023-07-27 DIAGNOSIS — R20.2 PARESTHESIA: Primary | ICD-10-CM

## 2023-07-27 DIAGNOSIS — R00.2 PALPITATIONS: ICD-10-CM

## 2023-07-27 PROCEDURE — 99214 OFFICE O/P EST MOD 30 MIN: CPT | Performed by: FAMILY MEDICINE

## 2023-07-27 PROCEDURE — 3074F SYST BP LT 130 MM HG: CPT | Performed by: FAMILY MEDICINE

## 2023-07-27 PROCEDURE — 3079F DIAST BP 80-89 MM HG: CPT | Performed by: FAMILY MEDICINE

## 2023-07-27 PROCEDURE — 3008F BODY MASS INDEX DOCD: CPT | Performed by: FAMILY MEDICINE

## 2023-07-27 RX ORDER — PYRITHIONE ZINC 1 G/ML
LOTION/SHAMPOO TOPICAL
COMMUNITY

## 2023-07-27 NOTE — PATIENT INSTRUCTIONS
Thank you for choosing Lelo Ferguson MD at [place] [address]  To Do: [facility]  1. Please see below  [facility] is located in [address]. Monday, Tuesday & Friday - 8 a.m. to 4 p.m. Wednesday, Thursday - 7 a.m. to 3 p.m. The lab is closed daily from 12 p.m.-12:30 p.m. Saturday lab hours by appointment. Call [phone] to schedule the appointment. Please signup for [employer], which is electronic access to your record if you have not done so. All your results will post on there. [email or web address]   You can NOW use [employer] to book your appointments with us, or consider using open access scheduling which is through the edward website [email or web address] and type in Lelo Ferguson MD and follow the links for \"Schedule Online Now\"    To schedule Imaging or tests at [facility] Scheduling [phone], Go to [facility] A ER Building (For example: CT scans, X rays, Ultrasound, MRI)  Cardiac Testing in ER building Building A second floor Cardiac Testing [phone] (For example: Holter Monitor, Cardiac Stress tests,Event Monitor, or 2D Echocardiograms)  [facility] call [phone] usually in Bldg A  Walk in Clinic in [place] at [facility]. [address] Mon-Fri at 8am-7:30 p.m., and Sat/Sun 9:00a. m.-4:30 p.m. Also at [address]  Call [phone] for info     Please call our office about any questions regarding your treatment/medicines/tests as a result of today's visit. For your safety, read the entire package insert of all medicines prescribed to you and be aware of all of the risks of treatment even beyond those discussed today. All therapies have potential risk of harm or side effects or medication interactions.   It is your duty and for your safety to discuss with the pharmacist and our office with questions, and to notify us and stop treatment if problems arise, but know that our intention is that the benefits outweigh those potential risks and we strive to make you healthier and to improve your quality of life. Referrals, and Radiology Information:    If your insurance requires a referral to a specialist, please allow 5 business days to process your referral request.    If Zandra Aranda MD orders a CT or MRI, it may take up to 10 business days to receive approval from your insurance company. Once our office has called informing you that the insurance company approved your testing, please call Central Scheduling at [phone]  Please allow our office 5 business days to contact you regarding any testing results. Refill policies:   Allow 3 business days for refills; controlled substances may take longer and must be picked up from the office in person. Narcotic medications can only be filled in 30 day increments and must be refilled at an office visit only. If your prescription is due for a refill, you may be due for a follow-up appointment. We cannot refill your maintenance medications at a preventative wellness visit. To best provide you care, patients receiving maintenance medications need to be seen at least twice a year.

## 2023-07-27 NOTE — PROGRESS NOTES
Subjective:   Patient ID: Sophie Gomez is a 28year old male. HPI  Mr. Cathryn Carrington is a pleasant 18-year-old gentleman with history of autism spectrum, ADHD, anxiety, allergic rhinitis here today after he was seen at the emergency room for numbness and tingling involving his right arm and right lower leg. This occurred over the weekend. Yesterday he reports that he has had is also on the right side of his head and face. He does not have fever, headaches, visual disturbance, neck pain, weakness, change in speech. CT scan of the brain was done which was essentially unremarkable. He is scheduled to see the neurologist in October of this year. He continues to have aforementioned symptoms. Also he has been experiencing palpitations which she has had in the past.  He had a Holter monitor done in 2021 which were basically unremarkable except for rare PACs and PVCs. He continues to see his psychiatrist for management of ADHD and anxiety. He continues to have brain fog which she had mentioned to his psychiatrist and was believed to be due to his anxiety. I had reviewed past medical and family histories together with allergy and medication lists documented. History/Other:   Review of Systems   Constitutional:  Negative for fatigue and fever. HENT:  Negative for congestion, sore throat and trouble swallowing. Respiratory:  Negative for cough and shortness of breath. Cardiovascular:  Negative for chest pain. Gastrointestinal:  Negative for abdominal pain, diarrhea, nausea and vomiting. Neurological:  Negative for dizziness, seizures, syncope, facial asymmetry, speech difficulty, weakness, light-headedness and headaches. Current Outpatient Medications   Medication Sig Dispense Refill    methylphenidate ER 54 MG Oral Tab CR Take 1 tablet (54 mg total) by mouth every morning. 30 tablet 0    mirtazapine 30 MG Oral Tab Take 1 tablet (30 mg total) by mouth nightly.  90 tablet 0    [START ON 8/7/2023] methylphenidate ER 54 MG Oral Tab CR Take 1 tablet (54 mg total) by mouth every morning. 30 tablet 0    [START ON 9/6/2023] methylphenidate ER 54 MG Oral Tab CR Take 1 tablet (54 mg total) by mouth every morning. 30 tablet 0    montelukast 10 MG Oral Tab Take 1 tablet (10 mg total) by mouth nightly. 90 tablet 3    loratadine (CLARITIN) 10 MG Oral Tab Take 1 tablet (10 mg total) by mouth daily as needed. 30 tablet 11    Multiple Vitamins-Minerals (MULTIVITAMIN GUMMIES ADULT OR) VITAFUSION      Metamucil Fiber Oral Chew Tab Metamucil Fiber Gummies       Allergies:  Guaifenesin             NAUSEA AND VOMITING  Augmentin, [Amoxici*      Sulfa Antibiotics           Objective:   Physical Exam  Vitals reviewed. Constitutional:       General: He is not in acute distress. HENT:      Head: Normocephalic and atraumatic. Right Ear: Tympanic membrane and ear canal normal.      Left Ear: Tympanic membrane and ear canal normal.      Nose: Nose normal.      Mouth/Throat:      Mouth: Mucous membranes are moist.      Pharynx: Oropharynx is clear. Eyes:      General: No scleral icterus. Conjunctiva/sclera: Conjunctivae normal.   Cardiovascular:      Rate and Rhythm: Normal rate and regular rhythm. Heart sounds: Normal heart sounds. No murmur heard. Pulmonary:      Effort: Pulmonary effort is normal. No respiratory distress. Breath sounds: Normal breath sounds. No wheezing or rales. Abdominal:      General: Bowel sounds are normal. There is no distension. Palpations: Abdomen is soft. There is no mass. Tenderness: There is no abdominal tenderness. Musculoskeletal:      Cervical back: Neck supple. No rigidity. Right lower leg: No edema. Left lower leg: No edema. Lymphadenopathy:      Cervical: No cervical adenopathy. Skin:     General: Skin is warm. Neurological:      General: No focal deficit present. Mental Status: He is alert and oriented to person, place, and time. Cranial Nerves: No cranial nerve deficit. Sensory: No sensory deficit. Motor: No weakness. Gait: Gait normal.   Psychiatric:         Mood and Affect: Mood normal.         Behavior: Behavior normal.         Thought Content: Thought content normal.         Assessment & Plan:   Paresthesia  (primary encounter diagnosis)  -Unclear as to etiology  - Neurologic exam was unrevealing  - Perhaps due to psychogenic factors such as anxiety  - However I will order MRI of the brain and cervical, thoracic and lumbar spine  - If symptoms worsen or persist I did admonished him to go to the emergency room  - Continue follow-up with neurology  - We will notify him once we get test results we will provide her with marker mentation  Palpitations  -Again likely psychogenic but will order echocardiogram and will provide him with recommendations once we obtain results  He will follow-up in 3 to 4 weeks      This note was prepared using [employer] in [place] voice recognition dictation software. As a result errors may occur. When identified these errors have been corrected. While every attempt is made to correct errors during dictation discrepancies may still exist          No orders of the defined types were placed in this encounter.       Meds This Visit:  Requested Prescriptions      No prescriptions requested or ordered in this encounter       Imaging & Referrals:  MRI BRAIN (CPT=70551)  MRI CERVICAL+THORACIC+LUMBAR SPINE  (CPT=72141/24148/98141)  CARD ECHO 2D DOPPLER (CPT=93306)

## 2023-08-24 ENCOUNTER — HOSPITAL ENCOUNTER (OUTPATIENT)
Dept: MRI IMAGING | Age: 28
Discharge: HOME | End: 2023-08-24
Attending: FAMILY MEDICINE
Payer: COMMERCIAL

## 2023-08-24 ENCOUNTER — HOSPITAL ENCOUNTER (OUTPATIENT)
Dept: CV DIAGNOSTICS | Age: 28
Discharge: HOME | End: 2023-08-24
Attending: FAMILY MEDICINE
Payer: COMMERCIAL

## 2023-08-24 DIAGNOSIS — R20.2 PARESTHESIA: ICD-10-CM

## 2023-08-24 DIAGNOSIS — R00.2 PALPITATIONS: ICD-10-CM

## 2023-08-24 PROCEDURE — 93306 TTE W/DOPPLER COMPLETE: CPT | Performed by: FAMILY MEDICINE

## 2023-08-24 PROCEDURE — 70551 MRI BRAIN STEM W/O DYE: CPT | Performed by: FAMILY MEDICINE

## 2023-08-24 PROCEDURE — 72148 MRI LUMBAR SPINE W/O DYE: CPT | Performed by: FAMILY MEDICINE

## 2023-08-24 PROCEDURE — 72146 MRI CHEST SPINE W/O DYE: CPT | Performed by: FAMILY MEDICINE

## 2023-08-24 PROCEDURE — 72141 MRI NECK SPINE W/O DYE: CPT | Performed by: FAMILY MEDICINE

## 2023-08-24 NOTE — PROGRESS NOTES
Imaging report reviewed and shows no concerning findings.  Please notify patient.    -Dr. Hank Ascencio

## 2023-09-05 ENCOUNTER — OFFICE VISIT (OUTPATIENT)
Dept: FAMILY MEDICINE CLINIC | Facility: CLINIC | Age: 28
End: 2023-09-05
Payer: COMMERCIAL

## 2023-09-05 VITALS
DIASTOLIC BLOOD PRESSURE: 70 MMHG | TEMPERATURE: 98 F | WEIGHT: 104 LBS | HEART RATE: 74 BPM | SYSTOLIC BLOOD PRESSURE: 102 MMHG | HEIGHT: 72 IN | OXYGEN SATURATION: 98 % | BODY MASS INDEX: 14.09 KG/M2 | RESPIRATION RATE: 16 BRPM

## 2023-09-05 DIAGNOSIS — R20.2 PARESTHESIA: Primary | ICD-10-CM

## 2023-09-05 PROCEDURE — 3008F BODY MASS INDEX DOCD: CPT | Performed by: FAMILY MEDICINE

## 2023-09-05 PROCEDURE — 99214 OFFICE O/P EST MOD 30 MIN: CPT | Performed by: FAMILY MEDICINE

## 2023-09-05 PROCEDURE — 3074F SYST BP LT 130 MM HG: CPT | Performed by: FAMILY MEDICINE

## 2023-09-05 PROCEDURE — 3078F DIAST BP <80 MM HG: CPT | Performed by: FAMILY MEDICINE

## 2023-09-05 NOTE — PROGRESS NOTES
Subjective:   Patient ID: Jessica Cotto is a 28year old male. HPI  Mr. Altamease Dance is a pleasant 18-year-old gentleman with history of autism spectrum, ADHD, anxiety, allergic rhinitis here today to follow-up for numbness involving his right side mainly the right side of his head, neck and right upper extremity. He had MRI of the brain, cervical, thoracic and lumbar spine which were all unremarkable. However he continues to have symptoms. Additional development is he does have numbness on his left second finger. Otherwise he did not have any new symptoms. He will be seeing the neurologist in October. History/Other:   Review of Systems  Constitutional:  Negative for fatigue and fever. HENT:  Negative for congestion, sore throat and trouble swallowing. Respiratory:  Negative for cough and shortness of breath. Cardiovascular:  Negative for chest pain. Gastrointestinal:  Negative for abdominal pain, diarrhea, nausea and vomiting. Neurological:  Negative for dizziness, seizures, syncope, facial asymmetry, speech difficulty, weakness, light-headedness and headaches. Current Outpatient Medications   Medication Sig Dispense Refill    Multiple Vitamins-Minerals (MULTIVITAMIN GUMMIES ADULT OR) VITAFUSION      Metamucil Fiber Oral Chew Tab Metamucil Fiber Gummies      mirtazapine 30 MG Oral Tab Take 1 tablet (30 mg total) by mouth nightly. 90 tablet 0    methylphenidate ER 54 MG Oral Tab CR Take 1 tablet (54 mg total) by mouth every morning. 30 tablet 0    [START ON 9/6/2023] methylphenidate ER 54 MG Oral Tab CR Take 1 tablet (54 mg total) by mouth every morning. 30 tablet 0    montelukast 10 MG Oral Tab Take 1 tablet (10 mg total) by mouth nightly. 90 tablet 3    loratadine (CLARITIN) 10 MG Oral Tab Take 1 tablet (10 mg total) by mouth daily as needed.  30 tablet 11     Allergies:  Guaifenesin             NAUSEA AND VOMITING  Augmentin, [Amoxici*      Sulfa Antibiotics           Objective:   Physical Exam  Vitals reviewed. Constitutional:       General: He is not in acute distress. HENT:      Head: Normocephalic and atraumatic. Right Ear: Tympanic membrane and ear canal normal.      Left Ear: Tympanic membrane and ear canal normal.      Nose: Nose normal.      Mouth/Throat:      Mouth: Mucous membranes are moist.      Pharynx: Oropharynx is clear. Eyes:      General: No scleral icterus. Conjunctiva/sclera: Conjunctivae normal.   Cardiovascular:      Rate and Rhythm: Normal rate and regular rhythm. Heart sounds: Normal heart sounds. No murmur heard. Pulmonary:      Effort: Pulmonary effort is normal. No respiratory distress. Breath sounds: Normal breath sounds. No wheezing or rales. Abdominal:      General: Bowel sounds are normal. There is no distension. Palpations: Abdomen is soft. There is no mass. Tenderness: There is no abdominal tenderness. Musculoskeletal:      Cervical back: Neck supple. No rigidity. Right lower leg: No edema. Left lower leg: No edema. Lymphadenopathy:      Cervical: No cervical adenopathy. Skin:     General: Skin is warm. Neurological:      General: No focal deficit present. Mental Status: He is alert and oriented to person, place, and time. Psychiatric:         Mood and Affect: Mood normal.         Behavior: Behavior normal.         Thought Content: Thought content normal.   Assessment & Plan:   Paresthesia  (primary encounter diagnosis)  -Unclear as to etiology  - I believe that his anxiety is under good control  - However continue seeing his psychiatrist  - Reviewed diagnostic test that were done previously which were basically unremarkable  - We will defer further testing to neurology in October  - Continue with current meds  Follow-up as needed at this point. This note was prepared using [employer] voice recognition dictation software. As a result errors may occur.  When identified these errors have been corrected. While every attempt is made to correct errors during dictation discrepancies may still exist          No orders of the defined types were placed in this encounter.       Meds This Visit:  Requested Prescriptions      No prescriptions requested or ordered in this encounter       Imaging & Referrals:  None

## 2023-09-05 NOTE — PATIENT INSTRUCTIONS
Thank you for choosing Iris Woods MD at [place] [address]  To Do: [facility]  1. Please see below  [facility] is located in [address]. Monday, Tuesday & Friday - 8 a.m. to 4 p.m. Wednesday, Thursday - 7 a.m. to 3 p.m. The lab is closed daily from 12 p.m.-12:30 p.m. Saturday lab hours by appointment. Call [phone] to schedule the appointment. Please signup for [employer], which is electronic access to your record if you have not done so. All your results will post on there. [email or web address]   You can NOW use [employer] to book your appointments with us, or consider using open access scheduling which is through the edward website [email or web address] and type in Iris Woods MD and follow the links for \"Schedule Online Now\"    To schedule Imaging or tests at [facility] Scheduling [phone], Go to [facility] A ER Building (For example: CT scans, X rays, Ultrasound, MRI)  Cardiac Testing in ER building Building A second floor Cardiac Testing [phone] (For example: Holter Monitor, Cardiac Stress tests,Event Monitor, or 2D Echocardiograms)  [facility] call [phone] usually in dg A  Walk in Clinic in [place] at [facility]. [address] Mon-Fri at 8am-7:30 p.m., and Sat/Sun 9:00a. m.-4:30 p.m. Also at [address]  Call [phone] for info     Please call our office about any questions regarding your treatment/medicines/tests as a result of today's visit. For your safety, read the entire package insert of all medicines prescribed to you and be aware of all of the risks of treatment even beyond those discussed today. All therapies have potential risk of harm or side effects or medication interactions.   It is your duty and for your safety to discuss with the pharmacist and our office with questions, and to notify us and stop treatment if problems arise, but know that our intention is that the benefits outweigh those potential risks and we strive to make you healthier and to improve your quality of life. Referrals, and Radiology Information:    If your insurance requires a referral to a specialist, please allow 5 business days to process your referral request.    If Scott Lowe MD orders a CT or MRI, it may take up to 10 business days to receive approval from your insurance company. Once our office has called informing you that the insurance company approved your testing, please call Central Scheduling at [phone]  Please allow our office 5 business days to contact you regarding any testing results. Refill policies:   Allow 3 business days for refills; controlled substances may take longer and must be picked up from the office in person. Narcotic medications can only be filled in 30 day increments and must be refilled at an office visit only. If your prescription is due for a refill, you may be due for a follow-up appointment. We cannot refill your maintenance medications at a preventative wellness visit. To best provide you care, patients receiving maintenance medications need to be seen at least twice a year.

## 2023-10-03 ENCOUNTER — OFFICE VISIT (OUTPATIENT)
Dept: NEUROLOGY | Facility: CLINIC | Age: 28
End: 2023-10-03
Payer: COMMERCIAL

## 2023-10-03 VITALS
DIASTOLIC BLOOD PRESSURE: 58 MMHG | WEIGHT: 104 LBS | HEART RATE: 91 BPM | BODY MASS INDEX: 14 KG/M2 | RESPIRATION RATE: 16 BRPM | SYSTOLIC BLOOD PRESSURE: 122 MMHG

## 2023-10-03 DIAGNOSIS — R20.2 PARESTHESIA: Primary | ICD-10-CM

## 2023-10-03 PROCEDURE — 3074F SYST BP LT 130 MM HG: CPT | Performed by: OTHER

## 2023-10-03 PROCEDURE — 99244 OFF/OP CNSLTJ NEW/EST MOD 40: CPT | Performed by: OTHER

## 2023-10-03 PROCEDURE — 3078F DIAST BP <80 MM HG: CPT | Performed by: OTHER

## 2023-10-03 NOTE — PROGRESS NOTES
Pt states was in ED on 07/19/23 for right arm tingling and warm sensation.  Dr. Beverly Blake ordered MRI

## 2024-04-04 ENCOUNTER — OFFICE VISIT (OUTPATIENT)
Dept: NEUROLOGY | Facility: CLINIC | Age: 29
End: 2024-04-04
Payer: COMMERCIAL

## 2024-04-04 VITALS
WEIGHT: 102 LBS | HEART RATE: 70 BPM | RESPIRATION RATE: 18 BRPM | SYSTOLIC BLOOD PRESSURE: 102 MMHG | DIASTOLIC BLOOD PRESSURE: 72 MMHG | BODY MASS INDEX: 14 KG/M2

## 2024-04-04 DIAGNOSIS — R20.2 PARESTHESIA: Primary | ICD-10-CM

## 2024-04-04 PROCEDURE — 3078F DIAST BP <80 MM HG: CPT | Performed by: OTHER

## 2024-04-04 PROCEDURE — 99214 OFFICE O/P EST MOD 30 MIN: CPT | Performed by: OTHER

## 2024-04-04 PROCEDURE — 3074F SYST BP LT 130 MM HG: CPT | Performed by: OTHER

## 2024-04-04 NOTE — PATIENT INSTRUCTIONS
Refill policies:    Allow 2-3 business days for refills; controlled substances may take longer.  Contact your pharmacy at least 5 days prior to running out of medication and have them send an electronic request or submit request through the “request refill” option in your [employer] account.  Refills are not addressed on weekends; covering physicians do not authorize routine medications on weekends.  No narcotics or controlled substances are refilled after noon on Fridays or by on call physicians.  By law, narcotics must be electronically prescribed.  A 30 day supply with no refills is the maximum allowed.  If your prescription is due for a refill, you may be due for a follow up appointment.  To best provide you care, patients receiving routine medications need to be seen at least once a year.  Patients receiving narcotic/controlled substance medications need to be seen at least once every 3 months.  In the event that your preferred pharmacy does not have the requested medication in stock (e.g. Backordered), it is your responsibility to find another pharmacy that has the requested medication available.  We will gladly send a new prescription to that pharmacy at your request.    Scheduling Tests:    If your physician has ordered radiology tests such as MRI or CT scans, please contact Central Scheduling at [phone] right away to schedule the test.  Once scheduled, the [facility] Centralized Referral Team will work with your insurance carrier to obtain pre-certification or prior authorization.  Depending on your insurance carrier, approval may take 3-10 days.  It is highly recommended patients assure they have received an authorization before having a test performed.  If test is done without insurance authorization, patient may be responsible for the entire amount billed.      Precertification and Prior Authorizations:  If your physician has recommended that you have a procedure or additional testing performed the [facility]  Centralized Referral Team will contact your insurance carrier to obtain pre-certification or prior authorization.    You are strongly encouraged to contact your insurance carrier to verify that your procedure/test has been approved and is a COVERED benefit.  Although the [facility] Centralized Referral Team does its due diligence, the insurance carrier gives the disclaimer that \"Although the procedure is authorized, this does not guarantee payment.\"    Ultimately the patient is responsible for payment.   Thank you for your understanding in this matter.  Paperwork Completion:  If you require FMLA or disability paperwork for your recovery, please make sure to either drop it off or have it faxed to our office at [phone]. Be sure the form has your name and date of birth on it.  The form will be faxed to our Forms Department and they will complete it for you.  There is a 25$ fee for all forms that need to be filled out.  Please be aware there is a 10-14 day turnaround time.  You will need to sign a release of information (ROLAND) form if your paperwork does not come with one.  You may call the Forms Department with any questions at [phone].  Their fax number is [phone].

## 2024-04-04 NOTE — PROGRESS NOTES
Patient here to follow up regarding tingling in arms/legs. Still happening on/off on the right. Shortly after LOV notes that symptoms will happen on left side but more mild.

## 2024-04-04 NOTE — PROGRESS NOTES
[facility] Outpatient Progress Note  Date of service: 4/4/2024    Assessment:     ICD-10-CM    1. Paresthesia  R20.2 EMG ([facility])     Refer to Neurology      (R20.2) Paresthesia  (primary encounter diagnosis), right hemisensory symptom intially and now left hand numbness of undetermined etiology     Plan:  Refer to Neurology re; 2nd opinion in [facility], referral given again today  EMG x 4 limbs ordered  Reviewed MRI brain and spine, unremarkable study  Cont follow up with Psychiatrist, PCP.  See orders and medications filed with this encounter. The patient indicates understanding of these issues and agrees with the plan.  RTC prn  Pt should go ER for any new or worsening symptoms and contact office       Procedures    Refer to Neurology       Subjective:   History:  Patient here to follow up regarding tingling in arms/legs. Still happening on/off on the right. Shortly after LOV notes that symptoms will happen on left side but more mild.   He has not sought 2nd opinion in [place] as I suggested last time.  Per initial visit:  Jameel Smith is a 28 year old male with past medical history as listed below presents here for initial evaluation of right side paresthesia since July. Pt states was in ED on 07/19/23 for right arm tingling and warm sensation. Dr. Abarca ordered MRI brain and whole spine. Symptom involves right side of head, right arm and leg (warm sensation) that occurs about 4-5 days per week, approximately ~4 hours on and off, no exacerbating or relieving factors; Occasional heart palpitations, left hand finger tips numbness also reported. He is not sure if anxiety plays a role .     History/Other:   REVIEW OF SYSTEMS:  A 10-point system was reviewed. Pertinent positives and negatives are noted as above       Current Outpatient Medications:     Multiple Vitamin (STRESS FORMULA OR), Take by mouth as needed. Avinash Goodbye Stess Gummy, Disp: , Rfl:     MIRTAZAPINE 30 MG Oral  Tab, TAKE ONE TABLET BY MOUTH NIGHTLY, Disp: 90 tablet, Rfl: 0    Methylphenidate HCl ER 54 MG Oral Tablet 24 Hr, Take 1 tablet by mouth every morning., Disp: 30 tablet, Rfl: 0    Ergocalciferol (VITAMIN D OR), Take 1,000 Units by mouth every other day., Disp: , Rfl:     Multiple Vitamins-Minerals (MULTIVITAMIN GUMMIES ADULT OR), VITAFUSION, Disp: , Rfl:     Metamucil Fiber Oral Chew Tab, as needed. Metamucil Fiber Gummies, Disp: , Rfl:     montelukast 10 MG Oral Tab, Take 1 tablet (10 mg total) by mouth nightly., Disp: 90 tablet, Rfl: 3    loratadine (CLARITIN) 10 MG Oral Tab, Take 1 tablet (10 mg total) by mouth daily as needed., Disp: 30 tablet, Rfl: 11    [START ON 5/3/2024] Methylphenidate HCl ER 54 MG Oral Tablet 24 Hr, Take 1 tablet by mouth every morning. (Patient not taking: Reported on 4/4/2024), Disp: 30 tablet, Rfl: 0  Allergies:  Allergies   Allergen Reactions    Guaifenesin NAUSEA AND VOMITING    Augmentin, [Amoxicillin-Pot Clavulanate]     Sulfa Antibiotics      Past Medical History:   Diagnosis Date    ADHD (attention deficit hyperactivity disorder) 6/9/2014    Anxiety 6/9/2014    Asperger syndrome (HCC) 6/9/2014    Constipation 4/8/2017    Eustachian tube dysfunction 4/12/2016    Insomnia 6/9/2014    [place] teeth extracted      Past Surgical History:   Procedure Laterality Date    WISDOM TEETH REMOVED  2015     Social History:  Social History     Tobacco Use    Smoking status: Never    Smokeless tobacco: Never   Substance Use Topics    Alcohol use: No     Family History   Problem Relation Age of Onset    Lipids Father     Hypertension Father     Stroke Father     Lipids Mother     Anxiety Mother     Migraines Mother     ADHD Other         mat female cousin    Alcohol and Other Disorders Associated Other         mat uncle    Diabetes Maternal Grandfather     Hypertension Maternal Grandfather     Stroke Maternal Grandfather     Hypertension Maternal Grandmother     Lipids Maternal Grandmother        Objective:   Neurological Examination:  /72   Pulse 70   Resp 18   Wt 102 lb (46.3 kg)   BMI 13.83 kg/m²   Mental status: A & O X 3  Language: no aphasia  Speech: no dysarthria  CN II-XII: intact   Motor strength: 5/5 all extremities  Tone: normal  DTRs: 2+ symmetric  Coordination: normal  Sensory: symmetric  Gait: normal    Test reviewed on 4/4/2024      Chucky Saravia MD (Michael)  Neurology  [facility]  4/4/2024, 4:16 PM  CC: Toro Abarca MD

## 2024-05-23 ENCOUNTER — PROCEDURE VISIT (OUTPATIENT)
Dept: NEUROLOGY | Facility: CLINIC | Age: 29
End: 2024-05-23

## 2024-05-23 DIAGNOSIS — R20.9 DISTURBANCE OF SKIN SENSATION: Primary | ICD-10-CM

## 2024-05-23 NOTE — PROCEDURES
[address] [address]        Full Name: Oscar Smith Gender: Male  Patient ID: [id number] YOB: 1995      Visit Date: 5/23/2024 11:12 AM  Age: 28 Years  Examining Physician: Dr. Alex Puentes  Referring Physician: Dr. Chucky Saravia      Sensory NCS      Nerve / Sites Rec. Site Onset Lat Peak Lat NP Amp PP Amp Segments Distance Velocity Comment     ms ms µV µV  cm m/s    R Median - Dig II (Antidromic)      Wrist Index 3.07 4.06 75.1 115.9 Wrist - Index 17 55    R Ulnar - Dig V (Antidromic)      Wrist Dig V 2.81 3.70 46.6 89.0 Wrist - Dig V 14 50    R Radial - Superficial (Antidromic)      Forearm Wrist 1.93 2.50 40.3 42.4 Forearm - Wrist 10 52    R Sural - (Antidromic)      Calf Ankle 3.23 3.85 10.7 21.8 Calf - Ankle 14 43    L Sural - (Antidromic)      Calf Ankle 2.71 3.28 16.6 17.4 Calf - Ankle 14 52        Motor NCS      Nerve / Sites Muscle Latency Amplitude Segments Dist. Lat Diff Velocity Comments     ms mV  cm ms m/s    R Median - APB      Wrist APB 3.54 21.2 Wrist - APB 8         Elbow APB 7.54 20.2 Elbow - Wrist 24 4.00 60.0    R Ulnar - ADM      Wrist ADM 3.31 10.5 Wrist - ADM 8         B.Elbow ADM 6.15 9.8 B.Elbow - Wrist 17 2.83 60.0       A.Elbow ADM 9.13 7.0 A.Elbow - B.Elbow 16 2.98 53.7    R Peroneal - EDB      Ankle EDB 5.71 7.4 Ankle - EDB 8         B. Fib Head EDB 11.63 7.3 B. Fib Head - Ankle 26 5.92 43.9    L Peroneal - EDB      Ankle EDB 5.81 11.4 Ankle - EDB 8         B. Fib Head EDB 12.15 9.5 B. Fib Head - Ankle  6.33     L Tibial - AH      Ankle AH 4.73 19.0 Ankle - AH 8         Knee AH 12.44 16.9 Knee - Ankle 40 7.71 51.9    R Tibial - AH      Ankle AH 5.21 19.4 Ankle - AH 8         Knee AH 12.31 16.7 Knee - Ankle 36 7.10 50.7        EMG Summary Table     Spontaneous MUAP Recruitment   Muscle IA Fib PSW Fasc H.F. Amp Dur. PPP Pattern   R. Deltoid N None None None None N N N N   R. Triceps brachii N None None None None N N  N N   R. Biceps brachii N None None None None N N N N   R. Flexor carpi radialis N None None None None N N N N   R. First dorsal interosseous N None None None None N N N N   R. Cervical paraspinals C5-C6 N None None None None       R. Lumbar paraspinals L4-L5 N None None None None       R. Lumbar paraspinals L5-S1 N None None None None       R. Tibialis anterior N None None None None N N N N   R. Gastrocnemius (Medial head) N None None None None N N N N   R. Vastus medialis N None None None None N N N N       Summary    The motor conduction test was normal in all 6 of the tested nerves: R Median - APB, R Ulnar - ADM, R Peroneal - EDB, L Peroneal - EDB, L Tibial - AH, R Tibial - AH.    The R median, R radial, R ulnar, and bilateral sural SNAPs were normal.     The needle EMG study was normal in all 11 tested muscles: R. Deltoid, R. Triceps brachii, R. Biceps brachii, R. Flexor carpi radialis, R. First dorsal interosseous, R. Cervical paraspinals (mid), R. Lumbar paraspinals (low), R. Lumbar paraspinals (mid), R. Tibialis anterior, R. Gastrocnemius (Medial head), R. Vastus medialis.        Oscar Smith [id number] 5/23/2024 11:12 AM     3 of 3    Conclusion:   This is a normal study. There is no electrodiagnostic evidence fo a diffuse large fibre polyneuropathy or a R cervical or R lumbar radiculopathy at this time      ________________________  Dr. Alex Puentes  Neurology    Oscar Smith [id number] 5/23/2024 11:12 AM     3 of 3

## 2024-05-29 RX ORDER — MONTELUKAST SODIUM 10 MG/1
10 TABLET ORAL NIGHTLY
Qty: 90 TABLET | Refills: 0 | Status: SHIPPED | OUTPATIENT
Start: 2024-05-29

## 2024-07-19 ENCOUNTER — HOSPITAL ENCOUNTER (OUTPATIENT)
Dept: GENERAL RADIOLOGY | Age: 29
Discharge: HOME | End: 2024-07-19
Attending: FAMILY MEDICINE
Payer: COMMERCIAL

## 2024-07-19 ENCOUNTER — LAB ENCOUNTER (OUTPATIENT)
Dept: LAB | Age: 29
End: 2024-07-19
Attending: FAMILY MEDICINE
Payer: COMMERCIAL

## 2024-07-19 ENCOUNTER — OFFICE VISIT (OUTPATIENT)
Dept: FAMILY MEDICINE CLINIC | Facility: CLINIC | Age: 29
End: 2024-07-19
Payer: COMMERCIAL

## 2024-07-19 VITALS
BODY MASS INDEX: 13.31 KG/M2 | OXYGEN SATURATION: 98 % | WEIGHT: 98.25 LBS | RESPIRATION RATE: 18 BRPM | HEIGHT: 72 IN | SYSTOLIC BLOOD PRESSURE: 100 MMHG | DIASTOLIC BLOOD PRESSURE: 60 MMHG | TEMPERATURE: 97 F | HEART RATE: 74 BPM

## 2024-07-19 DIAGNOSIS — M54.50 ACUTE LEFT-SIDED LOW BACK PAIN WITHOUT SCIATICA: Primary | ICD-10-CM

## 2024-07-19 DIAGNOSIS — M54.50 ACUTE LEFT-SIDED LOW BACK PAIN WITHOUT SCIATICA: ICD-10-CM

## 2024-07-19 LAB
BILIRUB UR QL STRIP.AUTO: NEGATIVE
CLARITY UR REFRACT.AUTO: CLEAR
COLOR UR AUTO: COLORLESS
GLUCOSE UR STRIP.AUTO-MCNC: NORMAL MG/DL
KETONES UR STRIP.AUTO-MCNC: NEGATIVE MG/DL
LEUKOCYTE ESTERASE UR QL STRIP.AUTO: NEGATIVE
NITRITE UR QL STRIP.AUTO: NEGATIVE
PH UR STRIP.AUTO: 7 [PH] (ref 5–8)
PROT UR STRIP.AUTO-MCNC: NEGATIVE MG/DL
RBC UR QL AUTO: NEGATIVE
SP GR UR STRIP.AUTO: 1.01 (ref 1–1.03)
UROBILINOGEN UR STRIP.AUTO-MCNC: NORMAL MG/DL

## 2024-07-19 PROCEDURE — 81003 URINALYSIS AUTO W/O SCOPE: CPT

## 2024-07-19 PROCEDURE — 3008F BODY MASS INDEX DOCD: CPT | Performed by: FAMILY MEDICINE

## 2024-07-19 PROCEDURE — 74018 RADEX ABDOMEN 1 VIEW: CPT | Performed by: FAMILY MEDICINE

## 2024-07-19 PROCEDURE — 99213 OFFICE O/P EST LOW 20 MIN: CPT | Performed by: FAMILY MEDICINE

## 2024-07-19 PROCEDURE — 3074F SYST BP LT 130 MM HG: CPT | Performed by: FAMILY MEDICINE

## 2024-07-19 PROCEDURE — 3078F DIAST BP <80 MM HG: CPT | Performed by: FAMILY MEDICINE

## 2024-07-19 NOTE — PROGRESS NOTES
Subjective:   Patient ID: Jameel Smith is a 28 year old male.    HPI  Mr. Smith is a pleasant 27 y/o M with history of Asperger, ADD, anxiety here for low back pain for 2 weeks. This is intermittent, burning, radiating to the Left lower abdomen. Pain is mild to moderate lasting for a few minutes not related to movement. On injury or trauma. No fever, no N/V/A, hematuria or frequency. Concerned about kidney stones.      I had reviewed past medical and family histories together with allergy and medication lists documented.      History/Other:   Review of Systems   Constitutional:  Negative for fatigue and fever.   Respiratory:  Negative for cough and shortness of breath.    Cardiovascular:  Negative for chest pain.   Neurological:  Negative for dizziness and weakness.     Current Outpatient Medications   Medication Sig Dispense Refill    MONTELUKAST 10 MG Oral Tab Take 1 tablet by mouth nightly. 90 tablet 0    mirtazapine 30 MG Oral Tab Take 1 tablet (30 mg total) by mouth nightly. 90 tablet 0    Methylphenidate HCl ER 54 MG Oral Tablet 24 Hr Take 1 tablet by mouth every morning. 30 tablet 0    Multiple Vitamin (STRESS FORMULA OR) Take by mouth as needed. Avinash Alta Stess Gummy      Ergocalciferol (VITAMIN D OR) Take 1,000 Units by mouth every other day.      Multiple Vitamins-Minerals (MULTIVITAMIN GUMMIES ADULT OR) VITAFUSION      Metamucil Fiber Oral Chew Tab as needed. Metamucil Fiber Gummies      loratadine (CLARITIN) 10 MG Oral Tab Take 1 tablet (10 mg total) by mouth daily as needed. 30 tablet 11     Allergies:  Allergies   Allergen Reactions    Guaifenesin NAUSEA AND VOMITING    Augmentin, [Amoxicillin-Pot Clavulanate]     Sulfa Antibiotics        Objective:   Physical Exam  Vitals reviewed.   Constitutional:       General: He is not in acute distress.  HENT:      Mouth/Throat:      Mouth: Mucous membranes are moist.      Pharynx: Oropharynx is clear.   Eyes:      General: No scleral icterus.      Conjunctiva/sclera: Conjunctivae normal.   Cardiovascular:      Rate and Rhythm: Normal rate and regular rhythm.      Heart sounds: Normal heart sounds. No murmur heard.  Pulmonary:      Effort: Pulmonary effort is normal. No respiratory distress.      Breath sounds: Normal breath sounds. No wheezing or rales.   Abdominal:      General: Abdomen is flat. Bowel sounds are normal. There is no distension.      Palpations: Abdomen is soft. There is no mass.      Tenderness: There is no abdominal tenderness. There is no left CVA tenderness, guarding or rebound.      Hernia: No hernia is present.   Musculoskeletal:      Cervical back: Neck supple.      Right lower leg: No edema.      Left lower leg: No edema.   Skin:     General: Skin is warm.   Neurological:      Mental Status: He is alert.   Psychiatric:         Mood and Affect: Mood normal.         Behavior: Behavior normal.         Assessment & Plan:   1. Acute left-sided low back pain without sciatica    - Ddx Musculoskeletal, Kidney Stones, Constipation  -order KUB xray and  UA and will provide recommendations further once results are available  May take Tylenol prn for pain  Go to ED if sxs worsen  F/U prn or for his next wellness    This note was prepared using Dragon Medical voice recognition dictation software. As a result errors may occur. When identified these errors have been corrected. While every attempt is made to correct errors during dictation discrepancies may still exist              Orders Placed This Encounter   Procedures    Urinalysis, Routine [E]       Meds This Visit:  Requested Prescriptions      No prescriptions requested or ordered in this encounter       Imaging & Referrals:  XR ABDOMEN (1 VIEW) (CPT=74018)

## 2024-07-19 NOTE — PATIENT INSTRUCTIONS
Thank you for choosing Toro Abarca MD at [facility]  To Do: Jameel Smith  1. Please see below   Call [phone] to schedule the appointment.   Please signup for PrivateMarkets, which is electronic access to your record if you have not done so.  All your results will post on there.  [email or web address]   You can NOW use SpredfashionharAava Mobile to book your appointments with us, or consider using open access scheduling which is through the [place] website [email or web address] and type in Toro Abarca MD and follow the links for \"Schedule Online Now\"    To schedule Imaging or tests at [place] call Central Scheduling [phone], Go to [facility] A ER Building (For example: CT scans, X rays, Ultrasound, MRI)  Cardiac Testing in ER building Building A second floor Cardiac Testing [phone] (For example: Holter Monitor, Cardiac Stress tests,Event Monitor, or 2D Echocardiograms)  [facility] call [phone] usually in [facility] A  Walk in Clinic in [place] at [address] Mon-Fri at 8am-7:30 p.m., and Sat/Sun 9:00a.m.-4:30 p.m.  Also at [address] [place] [address]  Call [phone] for info     Please call our office about any questions regarding your treatment/medicines/tests as a result of today's visit.  For your safety, read the entire package insert of all medicines prescribed to you and be aware of all of the risks of treatment even beyond those discussed today.  All therapies have potential risk of harm or side effects or medication interactions.  It is your duty and for your safety to discuss with the pharmacist and our office with questions, and to notify us and stop treatment if problems arise, but know that our intention is that the benefits outweigh those potential risks and we strive to make you healthier and to improve your quality of life.    Referrals, and Radiology Information:    If your insurance requires a referral to a specialist, please allow 5 business days to  process your referral request.    If Toro Abarca MD orders a CT or MRI, it may take up to 10 business days to receive approval from your insurance company. Once our office has called informing you that the insurance company approved your testing, please call Central Scheduling at [phone]  Please allow our office 5 business days to contact you regarding any testing results.    Refill policies:   Allow 3 business days for refills; controlled substances may take longer and must be picked up from the office in person.  Narcotic medications can only be filled in 30 day increments and must be refilled at an office visit only.  If your prescription is due for a refill, you may be due for a follow-up appointment.  We cannot refill your maintenance medications at a preventative wellness visit.  To best provide you care, patients receiving maintenance medications need to be seen at least twice a year.

## 2024-08-27 RX ORDER — MONTELUKAST SODIUM 10 MG/1
10 TABLET ORAL NIGHTLY
Qty: 90 TABLET | Refills: 0 | Status: SHIPPED | OUTPATIENT
Start: 2024-08-27

## 2024-11-19 RX ORDER — MONTELUKAST SODIUM 10 MG/1
10 TABLET ORAL NIGHTLY
Qty: 90 TABLET | Refills: 0 | Status: SHIPPED | OUTPATIENT
Start: 2024-11-19

## 2025-01-17 ENCOUNTER — OFFICE VISIT (OUTPATIENT)
Dept: FAMILY MEDICINE CLINIC | Facility: CLINIC | Age: 30
End: 2025-01-17
Payer: COMMERCIAL

## 2025-01-17 VITALS
BODY MASS INDEX: 13.54 KG/M2 | WEIGHT: 100 LBS | TEMPERATURE: 98 F | HEART RATE: 84 BPM | SYSTOLIC BLOOD PRESSURE: 70 MMHG | HEIGHT: 72 IN | RESPIRATION RATE: 16 BRPM | DIASTOLIC BLOOD PRESSURE: 40 MMHG | OXYGEN SATURATION: 99 %

## 2025-01-17 DIAGNOSIS — R32 URINARY INCONTINENCE, UNSPECIFIED TYPE: ICD-10-CM

## 2025-01-17 DIAGNOSIS — R10.30 LOWER ABDOMINAL PAIN: Primary | ICD-10-CM

## 2025-01-17 PROCEDURE — 3008F BODY MASS INDEX DOCD: CPT | Performed by: FAMILY MEDICINE

## 2025-01-17 PROCEDURE — 99214 OFFICE O/P EST MOD 30 MIN: CPT | Performed by: FAMILY MEDICINE

## 2025-01-17 PROCEDURE — G2211 COMPLEX E/M VISIT ADD ON: HCPCS | Performed by: FAMILY MEDICINE

## 2025-01-17 PROCEDURE — 3074F SYST BP LT 130 MM HG: CPT | Performed by: FAMILY MEDICINE

## 2025-01-17 PROCEDURE — 3078F DIAST BP <80 MM HG: CPT | Performed by: FAMILY MEDICINE

## 2025-01-17 RX ORDER — FLUTICASONE PROPIONATE 50 MCG
2 SPRAY, SUSPENSION (ML) NASAL DAILY
COMMUNITY
Start: 2024-08-20

## 2025-01-17 NOTE — PATIENT INSTRUCTIONS
Thank you for choosing Toro Abarca MD at [facility]  To Do: Jameel Smith  1. Please see below   Call [phone] to schedule the appointment.   Please signup for Mangatar, which is electronic access to your record if you have not done so.  All your results will post on there.  [email or web address]   You can NOW use FONU2harTrading Blox to book your appointments with us, or consider using open access scheduling which is through the [place] website [email or web address] and type in Toro Abarca MD and follow the links for \"Schedule Online Now\"    To schedule Imaging or tests at [place] call Central Scheduling [phone], Go to [facility] A ER Building (For example: CT scans, X rays, Ultrasound, MRI)  Cardiac Testing in ER building Building A second floor Cardiac Testing [phone] (For example: Holter Monitor, Cardiac Stress tests,Event Monitor, or 2D Echocardiograms)  [facility] call [phone] usually in [facility] A  Walk in Clinic in [place] at [address] Mon-Fri at 8am-7:30 p.m., and Sat/Sun 9:00a.m.-4:30 p.m.  Also at [address] [place] [address]  Call [phone] for info     Please call our office about any questions regarding your treatment/medicines/tests as a result of today's visit.  For your safety, read the entire package insert of all medicines prescribed to you and be aware of all of the risks of treatment even beyond those discussed today.  All therapies have potential risk of harm or side effects or medication interactions.  It is your duty and for your safety to discuss with the pharmacist and our office with questions, and to notify us and stop treatment if problems arise, but know that our intention is that the benefits outweigh those potential risks and we strive to make you healthier and to improve your quality of life.    Referrals, and Radiology Information:    If your insurance requires a referral to a specialist, please allow 5 business days to  process your referral request.    If Toro Abarca MD orders a CT or MRI, it may take up to 10 business days to receive approval from your insurance company. Once our office has called informing you that the insurance company approved your testing, please call Central Scheduling at [phone]  Please allow our office 5 business days to contact you regarding any testing results.    Refill policies:   Allow 3 business days for refills; controlled substances may take longer and must be picked up from the office in person.  Narcotic medications can only be filled in 30 day increments and must be refilled at an office visit only.  If your prescription is due for a refill, you may be due for a follow-up appointment.  We cannot refill your maintenance medications at a preventative wellness visit.  To best provide you care, patients receiving maintenance medications need to be seen at least twice a year.

## 2025-01-17 NOTE — PROGRESS NOTES
Subjective:   Patient ID: Jameel Smith is a 29 year old male.    HPI  Mr. Smith is a pleasant 28 y/o M with history of Asperger, ADD, anxiety, chronic constipation presenting today for lower abdominal pain for the past 2 months.  He localizes the pain mostly on the mid aspect of the lower abdomen.  Mainly it is more of a discomfort and tends occur when he moves.  When he exercises and when he lifts and carries or bends he would feel it this way.  He had noticed it more after he was pulling heavy objects at home.    However he had noticed also that he has been having more urinary leakage.  In the past he has had this issue before and was attributed to being constipated.    He does not notice any urinary frequency, hesitancy, blood in the urine.  No fever no nausea no vomiting no back pain.    I had reviewed past medical and family histories together with allergy and medication lists documented.        History/Other:   Review of Systems   Constitutional:  Negative for fatigue and fever.   HENT:  Negative for sore throat.    Respiratory:  Negative for cough and shortness of breath.    Cardiovascular:  Negative for chest pain.   Neurological:  Negative for dizziness and weakness.     Current Outpatient Medications   Medication Sig Dispense Refill    fluticasone propionate 50 MCG/ACT Nasal Suspension 2 sprays by Nasal route daily.      B Complex-C-Folic Acid (STRESS B COMPLEX OR) Take by mouth.      [START ON 1/29/2025] mirtazapine 30 MG Oral Tab Take 1 tablet (30 mg total) by mouth nightly. 90 tablet 0    [START ON 1/26/2025] methylphenidate ER 54 MG Oral Tab CR Take 1 tablet (54 mg total) by mouth every morning. 30 tablet 0    [START ON 2/25/2025] methylphenidate ER 54 MG Oral Tab CR Take 1 tablet (54 mg total) by mouth every morning. 30 tablet 0    MONTELUKAST 10 MG Oral Tab Take 1 tablet by mouth nightly. 90 tablet 0    Ergocalciferol (VITAMIN D OR) Take 1,000 Units by mouth every other day.      Multiple  Vitamins-Minerals (MULTIVITAMIN GUMMIES ADULT OR) VITAFUSION       Allergies:Allergies[1]    Objective:   Physical Exam  Vitals reviewed.   Constitutional:       General: He is not in acute distress.  HENT:      Mouth/Throat:      Mouth: Mucous membranes are moist.      Pharynx: Oropharynx is clear.   Eyes:      General: No scleral icterus.     Conjunctiva/sclera: Conjunctivae normal.   Cardiovascular:      Rate and Rhythm: Normal rate and regular rhythm.      Heart sounds: Normal heart sounds. No murmur heard.  Pulmonary:      Effort: Pulmonary effort is normal. No respiratory distress.      Breath sounds: Normal breath sounds. No wheezing or rales.   Abdominal:      General: Bowel sounds are normal. There is no distension.      Palpations: Abdomen is soft. There is no mass.      Tenderness: There is no abdominal tenderness. There is no right CVA tenderness, left CVA tenderness or rebound.      Hernia: No hernia is present.   Musculoskeletal:      Cervical back: Neck supple.      Right lower leg: No edema.      Left lower leg: No edema.   Lymphadenopathy:      Cervical: No cervical adenopathy.   Skin:     General: Skin is warm.   Neurological:      General: No focal deficit present.      Mental Status: He is alert.   Psychiatric:         Mood and Affect: Mood normal.         Behavior: Behavior normal.         Assessment & Plan:   1. Lower abdominal pain   -Likely musculoskeletal in nature possibly due to underlying strain  - Will order CT scan of the abdomen pelvis to check for possible hernia or muscle injury or tear  - Will consider physical therapy which has been ordered for him but will await results  - May take Tylenol as needed for pain   2. Urinary incontinence, unspecified type   -I am not certain if recent abdominal pain would make this worse  - Await CT scan report  - Will refer to urology for further evaluation management  - Again may benefit from physical therapy perhaps doing pelvic floor strengthening  and exercises     This note was prepared using Dragon Medical voice recognition dictation software. As a result errors may occur. When identified these errors have been corrected. While every attempt is made to correct errors during dictation discrepancies may still exist          No orders of the defined types were placed in this encounter.      Meds This Visit:  Requested Prescriptions      No prescriptions requested or ordered in this encounter       Imaging & Referrals:  OP REFERRAL TO [facility]  UROLOGY - INTERNAL  CT ABDOMEN+PELVIS(CPT=74176)         [1]   Allergies  Allergen Reactions    Guaifenesin NAUSEA AND VOMITING    Augmentin, [Amoxicillin-Pot Clavulanate]     Sulfa Antibiotics

## 2025-01-24 ENCOUNTER — HOSPITAL ENCOUNTER (OUTPATIENT)
Dept: CT IMAGING | Age: 30
Discharge: HOME | End: 2025-01-24
Attending: FAMILY MEDICINE
Payer: COMMERCIAL

## 2025-01-24 DIAGNOSIS — R10.30 LOWER ABDOMINAL PAIN: ICD-10-CM

## 2025-01-24 DIAGNOSIS — R32 URINARY INCONTINENCE, UNSPECIFIED TYPE: ICD-10-CM

## 2025-01-24 PROCEDURE — 74176 CT ABD & PELVIS W/O CONTRAST: CPT | Performed by: FAMILY MEDICINE

## 2025-02-14 RX ORDER — MONTELUKAST SODIUM 10 MG/1
10 TABLET ORAL NIGHTLY
Qty: 90 TABLET | Refills: 0 | Status: SHIPPED | OUTPATIENT
Start: 2025-02-14

## 2025-02-20 NOTE — PROGRESS NOTES
Labs with no concerning values.  Please notify patient.    -Dr. Gabrielle Betancur
bed mobility, transfers and ambulation/functional limitations in following categories

## 2025-04-03 ENCOUNTER — OFFICE VISIT (OUTPATIENT)
Dept: SURGERY | Facility: CLINIC | Age: 30
End: 2025-04-03
Payer: COMMERCIAL

## 2025-04-03 DIAGNOSIS — N39.3 SUI (STRESS URINARY INCONTINENCE), MALE: Primary | ICD-10-CM

## 2025-04-03 DIAGNOSIS — R82.90 URINE FINDING: ICD-10-CM

## 2025-04-03 LAB
APPEARANCE: CLEAR
BILIRUBIN: NEGATIVE
GLUCOSE (URINE DIPSTICK): NEGATIVE MG/DL
KETONES (URINE DIPSTICK): NEGATIVE MG/DL
LEUKOCYTES: NEGATIVE
MULTISTIX LOT#: ABNORMAL NUMERIC
NITRITE, URINE: NEGATIVE
PH, URINE: 6.5 (ref 4.5–8)
PROTEIN (URINE DIPSTICK): NEGATIVE MG/DL
SPECIFIC GRAVITY: 1.01 (ref 1–1.03)
URINE-COLOR: YELLOW
UROBILINOGEN,SEMI-QN: 0.2 MG/DL (ref 0–1.9)

## 2025-04-03 PROCEDURE — 99204 OFFICE O/P NEW MOD 45 MIN: CPT | Performed by: SURGERY

## 2025-04-03 PROCEDURE — 81003 URINALYSIS AUTO W/O SCOPE: CPT | Performed by: SURGERY

## 2025-04-03 NOTE — PROGRESS NOTES
Urology Clinic Note - New Patient    Referring Provider:  No referring provider defined for this encounter.     Primary Care Provider:  Toro Abarca MD     Chief Complaint:   Urinary incontinence, pelvic pain    HPI & Assessment:   Jameel Smith is a 29 year old male with history of ADHD, anxiety, Asperger syndrome referred for pelvic pain and urinary incontinence.    CT scan 1/24/2025 showed no hydronephrosis, punctate 1 mm nonobstructing left renal stone, mild bladder distention, significant stool burden within the colon.    He endorses intermittent abdominal and pelvic pain.  He does deal with constipation.  Currently his abdominal/pelvic pain are improved.  Denies weak urinary stream or gross hematuria.  He notes occasional stress incontinence when lifting or coughing/sneezing.  Denies bladder urgency or urge incontinence.    AUA symptom score is 2/3 with LUTS.    Post-void residual bladder scan: 1 mL    Urinalysis (clinic dip): Trace blood    Plan:   -Manage constipation  -Kegel exercises  -Return to clinic if symptoms persist despite regular Kegel exercises       PSA:  No results found for: \"PSA\", \"PERCENTPSA\", \"PSAS\", \"PSAULTRA\", \"QPSA\", \"PSATOT\", \"TOTPSADX\", \"TOTPSASCREEN\"     History:     Past Medical History:    ADHD (attention deficit hyperactivity disorder)    Allergic rhinitis    Anxiety    Asperger syndrome (HCC)    Constipation    Eustachian tube dysfunction    Insomnia    Thurmond teeth extracted       Past Surgical History:   Procedure Laterality Date    Other surgical history  Around 2015    Thurmond teeth removal    Thurmond teeth removed  2015       Family History   Problem Relation Age of Onset    Lipids Father     Hypertension Father     Stroke Father     Lipids Mother     Anxiety Mother     Migraines Mother     Obesity Mother     ADHD Other         mat female cousin    Alcohol and Other Disorders Associated Other         mat uncle    Diabetes Maternal Grandfather     Hypertension  Maternal Grandfather     Stroke Maternal Grandfather     Cancer Maternal Grandfather     Hypertension Maternal Grandmother     Lipids Maternal Grandmother     Cancer Maternal Grandmother        Social History     Socioeconomic History    Marital status: Single   Tobacco Use    Smoking status: Never     Passive exposure: Never    Smokeless tobacco: Never   Vaping Use    Vaping status: Never Used   Substance and Sexual Activity    Alcohol use: No    Drug use: No   Other Topics Concern    Caffeine Concern No    Exercise Yes     Comment: Seeking guidance on recommended exercise    Seat Belt No    Special Diet Yes     Comment: Seeking guidance on potential diets for brain fog, & anxiety    Stress Concern Yes     Comment: How to manage and monitor, signs/things to look out for    Weight Concern No       Medications (Active prior to today's visit):  Current Outpatient Medications   Medication Sig Dispense Refill    [START ON 4/27/2025] mirtazapine 30 MG Oral Tab Take 1 tablet (30 mg total) by mouth nightly. 90 tablet 0    methylphenidate ER 54 MG Oral Tab CR Take 1 tablet (54 mg total) by mouth every morning. 30 tablet 0    [START ON 4/25/2025] methylphenidate ER 54 MG Oral Tab CR Take 1 tablet (54 mg total) by mouth every morning. 30 tablet 0    MONTELUKAST 10 MG Oral Tab Take 1 tablet by mouth nightly. 90 tablet 0    fluticasone propionate 50 MCG/ACT Nasal Suspension 2 sprays by Nasal route daily.      B Complex-C-Folic Acid (STRESS B COMPLEX OR) Take by mouth.      Ergocalciferol (VITAMIN D OR) Take 1,000 Units by mouth every other day.      Multiple Vitamins-Minerals (MULTIVITAMIN GUMMIES ADULT OR) VITAFUSION         Allergies:  Allergies[1]      Review of Systems:   A comprehensive 10-point review of systems was completed.  Pertinent positives and negatives are noted in the the HPI.    Physical Exam:   CONSTITUTIONAL: Well developed, well nourished, in no acute distress  NEUROLOGIC: Alert and oriented  HEAD:  Normocephalic, atraumatic  EYES: Sclera non-icteric  ENT: Hearing intact, moist mucous membranes  NECK: No obvious goiter or masses  RESPIRATORY: Normal respiratory effort  SKIN: No evident rashes  ABDOMEN: Soft, non-tender, non-distended  GENITOURINARY: Normal phallus, orthotopic meatus, normal bilateral testicles    Thank you for this consult.    I have personally reviewed all relevant medical records, labs, and imaging.    Medical Decision Making  Stress urinary incontinence: Undiagnosed new problem  Abdominal/pelvic pain: Undiagnosed new problem    Amount and/or Complexity of Data Reviewed  External Data Reviewed: labs and notes.  Radiology: independent interpretation performed.        Kendall Odom MD  Staff Urologist  [facility]  Office: [phone]             [1]   Allergies  Allergen Reactions    Guaifenesin NAUSEA AND VOMITING    Augmentin, [Amoxicillin-Pot Clavulanate]     Sulfa Antibiotics

## 2025-04-04 NOTE — PROGRESS NOTES
No RBC detected on microscopic UA.     Imperator message sent to patient.    Future Appointments  5/28/2025  11:00 AM   Darwin Vo APRN          [facility]

## 2025-05-12 RX ORDER — MONTELUKAST SODIUM 10 MG/1
10 TABLET ORAL NIGHTLY
Qty: 90 TABLET | Refills: 0 | Status: SHIPPED | OUTPATIENT
Start: 2025-05-12

## 2025-08-12 RX ORDER — MONTELUKAST SODIUM 10 MG/1
10 TABLET ORAL NIGHTLY
Qty: 90 TABLET | Refills: 0 | Status: SHIPPED | OUTPATIENT
Start: 2025-08-12

## (undated) NOTE — MR AVS SNAPSHOT
[address] [facility] [address] [phone]               Thank you for choosing us for your health care visit with MEGAN Gaspar.   We are glad to serve you and happy to provide you with this summary of y · Diet low in fiber  · Too much dairy  · Not drinking enough liquids  · Lack of exercise or physical activity. This is especially true for older adults.   · Changes in lifestyle or daily routine, including pregnancy, aging, work, and travel  · Frequent use doctor or pharmacist if you have questions. Prescription pain medicines can cause constipation. If you are taking this kind of medicine, ask your healthcare provider if you should also take a stool softener.   Medicines you may take to treat constipation i water to soften the stool. This helps the stool pass through the colon more easily. When you increase your fiber intake, do it slowly to avoid side effects such as bloating. Also increase the amount of water that you drink.  Eating more of the following radha tells you how much fiber is in a serving. Types of fiber and their benefits  There are two types of fiber: insoluble and soluble. They both aid digestion and help you maintain a healthy weight. · Insoluble fiber.  This is found in whole grains, cereals, Before you reach for the fiber supplements, think about this. Fiber is found naturally in healthy whole foods. It gives you that feeling of fullness after you eat. Taking fiber supplements or eating fiber-enriched foods will not give you this full feeling. Sign up for [employer], your secure online medical record. [employer] will allow you to access patient instructions from your recent visit,  view other health information, and more. To sign up or find more information, go to [email or web address] and cl increments are effective and add up over the week   2 ½ hours per week – spread out over time Use a vashti to keep you motivated   Don’t forget strength training with weights and resistance Set goals and track your progress   You don’t need to join a gym.

## (undated) NOTE — MR AVS SNAPSHOT
[facility] [address]  [address] [facility] [place]  [phone]               Thank you for choosing us for your health care visit with Eliud Ortega MD.  We are glad to serve you and happy to provide you with t [phone], Go to [facility] A ER Building (For example: CT scans, X rays, Ultrasound, MRI)  •Cardiac Testing in ER building Building A second floor Cardiac Testing [phone] (For example: Holter Monitor, Cardiac Stress tests,Event Monitor, or 2D Echocardio If your prescription is due for a refill, you may be due for a follow-up appointment. We cannot refill your maintenance medications at a preventative wellness visit.   To best provide you care, patients receiving maintenance medications need to be seen at visit,  view other health information, and more. To sign up or find more information, go to [email or web address] and click on the Sign Up Now link in the [employer] box.      Enter your [employer] Activation Code exactly as it appears below along with yo